# Patient Record
Sex: FEMALE | Race: AMERICAN INDIAN OR ALASKA NATIVE
[De-identification: names, ages, dates, MRNs, and addresses within clinical notes are randomized per-mention and may not be internally consistent; named-entity substitution may affect disease eponyms.]

---

## 2017-06-12 ENCOUNTER — HOSPITAL ENCOUNTER (OUTPATIENT)
Dept: HOSPITAL 5 - FLUORO | Age: 47
Discharge: HOME | End: 2017-06-12
Attending: SPECIALIST
Payer: MEDICAID

## 2017-06-12 DIAGNOSIS — I10: ICD-10-CM

## 2017-06-12 DIAGNOSIS — K30: Primary | ICD-10-CM

## 2017-06-12 PROCEDURE — 74247: CPT

## 2017-06-12 NOTE — FLUOROSCOPY REPORT
UPPER GI



INDICATION: Dyspepsia. Lap Band in 2015.



COMPARISON: 9/26/2015 upper GI report; images not retrievable at this 

time.



FINDINGS: Upper GI performed. Patient swallowed thick and thin barium 

without any difficulty and also tolerated effervescent granules well.



 view demonstrates normal appearance of the lap band and its 

connector tubing leading to the port overlying the L3 vertebral body in 

the midline. Nonobstructive bowel gas pattern.  Colonic stool/possible 

constipation.  Lower thoracic and lower lumbar degenerative changes.  

Bilateral SI joint sclerosis, more so along the iliac aspects.  Small 

left pelvic phlebolith.  Left iliac vein stent.



Esophagus is normal in course and caliber without focal mucosal 

abnormality or abnormal peristalsis. No demonstratable hiatal hernia or 

gastroesophageal reflux. Prompt passage of contrast through the lap 

band into the opacified stomach without evidence of peptic ulcer 

disease. Normal duodenal bulb and C-loop.



CONCLUSION: Normal upper GI exam, as described. Lap band correlation 

may also be obtained clinically.



Thank you for the opportunity to participate in this patient's care.

## 2017-08-03 ENCOUNTER — HOSPITAL ENCOUNTER (OUTPATIENT)
Dept: HOSPITAL 5 - GIO | Age: 47
Discharge: HOME | End: 2017-08-03
Attending: SURGERY
Payer: MEDICAID

## 2017-08-03 VITALS — DIASTOLIC BLOOD PRESSURE: 118 MMHG | SYSTOLIC BLOOD PRESSURE: 179 MMHG

## 2017-08-03 DIAGNOSIS — K21.9: Primary | ICD-10-CM

## 2017-08-03 DIAGNOSIS — Z98.84: ICD-10-CM

## 2017-08-03 DIAGNOSIS — Z79.82: ICD-10-CM

## 2017-08-03 DIAGNOSIS — Z98.890: ICD-10-CM

## 2017-08-03 DIAGNOSIS — Z88.8: ICD-10-CM

## 2017-08-03 DIAGNOSIS — Z53.8: ICD-10-CM

## 2017-08-03 DIAGNOSIS — Z79.899: ICD-10-CM

## 2017-08-03 DIAGNOSIS — I10: ICD-10-CM

## 2017-08-03 DIAGNOSIS — M06.9: ICD-10-CM

## 2017-08-03 NOTE — ANESTHESIA CONSULTATION
Anesthesia Consult and Med Hx


Date of service: 08/03/17





- Airway


Anesthetic Teeth Evaluation: Good


ROM Head & Neck: Adequate


Mental/Hyoid Distance: Adequate


Mallampati Class: Class I


Intubation Access Assessment: Good





- Pulmonary Exam


CTA: Yes





- Cardiac Exam


Cardiac Exam: RRR





- Pre-Operative Health Status


ASA Pre-Surgery Classification: ASA3


Proposed Anesthetic Plan: MAC





- Pulmonary


Hx Smoking: No


Hx Sleep Apnea: Yes (doing sleep study on 8/7/17)





- Cardiovascular System


Hx Hypertension: Yes (metoprolol, losartan/HCTZ)


Hx Coronary Artery Disease: Yes


Hx Heart Attack/AMI: Yes (March 2017, last saw cardiologist a month ago for 

follow up)





- Other Systems


Hx Obesity: Yes

## 2017-08-21 ENCOUNTER — HOSPITAL ENCOUNTER (OUTPATIENT)
Dept: HOSPITAL 5 - GIO | Age: 47
Discharge: HOME | End: 2017-08-21
Attending: SURGERY
Payer: MEDICAID

## 2017-08-21 VITALS — DIASTOLIC BLOOD PRESSURE: 108 MMHG | SYSTOLIC BLOOD PRESSURE: 156 MMHG

## 2017-08-21 DIAGNOSIS — I25.10: ICD-10-CM

## 2017-08-21 DIAGNOSIS — Z79.82: ICD-10-CM

## 2017-08-21 DIAGNOSIS — E66.9: ICD-10-CM

## 2017-08-21 DIAGNOSIS — K44.9: Primary | ICD-10-CM

## 2017-08-21 DIAGNOSIS — G47.33: ICD-10-CM

## 2017-08-21 DIAGNOSIS — I25.2: ICD-10-CM

## 2017-08-21 DIAGNOSIS — I10: ICD-10-CM

## 2017-08-21 DIAGNOSIS — Z98.84: ICD-10-CM

## 2017-08-21 DIAGNOSIS — Z88.5: ICD-10-CM

## 2017-08-21 DIAGNOSIS — M06.9: ICD-10-CM

## 2017-08-21 DIAGNOSIS — Z99.81: ICD-10-CM

## 2017-08-21 DIAGNOSIS — Z88.8: ICD-10-CM

## 2017-08-21 DIAGNOSIS — Z79.899: ICD-10-CM

## 2017-08-21 PROCEDURE — 43239 EGD BIOPSY SINGLE/MULTIPLE: CPT

## 2017-08-21 PROCEDURE — 96374 THER/PROPH/DIAG INJ IV PUSH: CPT

## 2017-08-21 PROCEDURE — 88342 IMHCHEM/IMCYTCHM 1ST ANTB: CPT

## 2017-08-21 PROCEDURE — 88305 TISSUE EXAM BY PATHOLOGIST: CPT

## 2017-08-21 NOTE — ANESTHESIA CONSULTATION
Anesthesia Consult and Med Hx


Date of service: 08/21/17





- Airway


Anesthetic Teeth Evaluation: Poor (missing rt upper canine)


ROM Head & Neck: Adequate


Mental/Hyoid Distance: Adequate


Mallampati Class: Class II


Intubation Access Assessment: Probably Good





- Pulmonary Exam


CTA: Yes





- Cardiac Exam


Cardiac Exam: RRR





- Pre-Operative Health Status


ASA Pre-Surgery Classification: ASA3


Proposed Anesthetic Plan: MAC





- Pulmonary


Hx Smoking: No


Hx Sleep Apnea: Yes (on O2 2L at night, CPAP pending)





- Cardiovascular System


Hx Hypertension: Yes


Hx Coronary Artery Disease: Yes


Hx Heart Attack/AMI: Yes (3/17)


Hx Percutaneous Transluminal Coronary Angioplasty (PTCA): No





- Central Nervous System


Hx Neuromuscular Disorder: Yes (RA)


Hx Seizures: No


CVA: No





- Endocrine


Hx Renal Disease: No


Hx Liver Disease: No


Hx Non-Insulin Dependent Diabetes: No





- Other Systems


Hx Obesity: Yes





- Additional Comments


Anesthesia Medical History Comments: NAC

## 2017-08-21 NOTE — DISCHARGE SUMMARY
Providers





- Providers


Date of discharge: 08/21/17


Attending physician: 


ALVINA REAL








Hospitalization


Condition: Good


Procedures: 





EGD, biopsy 


Hospital course: 





47 F had an EGD to evaluate her gastric band due to abdominal discomfort. 


Disposition: DC-01 TO HOME OR SELFCARE





Core Measure Documentation





- Palliative Care


Palliative Care/ Comfort Measures: Not Applicable





- Core Measures


Any of the following diagnoses?: none





Exam





- Physical Exam


Narrative exam: 





no change from preop





- Constitutional


Vitals: 


 











Temp Pulse Resp BP Pulse Ox


 


 97.6 F   68   14   189/121   100 


 


 08/21/17 08:06  08/21/17 08:06  08/21/17 08:06  08/21/17 08:06  08/21/17 08:06














Plan


Activity: no restrictions


Diet: other (high protien)

## 2017-12-14 ENCOUNTER — HOSPITAL ENCOUNTER (INPATIENT)
Dept: HOSPITAL 5 - ED | Age: 47
LOS: 1 days | Discharge: HOME | DRG: 206 | End: 2017-12-15
Attending: INTERNAL MEDICINE | Admitting: INTERNAL MEDICINE
Payer: MEDICAID

## 2017-12-14 DIAGNOSIS — E78.5: ICD-10-CM

## 2017-12-14 DIAGNOSIS — G62.9: ICD-10-CM

## 2017-12-14 DIAGNOSIS — G47.33: ICD-10-CM

## 2017-12-14 DIAGNOSIS — I25.2: ICD-10-CM

## 2017-12-14 DIAGNOSIS — K29.00: ICD-10-CM

## 2017-12-14 DIAGNOSIS — R00.2: ICD-10-CM

## 2017-12-14 DIAGNOSIS — Z79.899: ICD-10-CM

## 2017-12-14 DIAGNOSIS — Z72.89: ICD-10-CM

## 2017-12-14 DIAGNOSIS — M79.7: ICD-10-CM

## 2017-12-14 DIAGNOSIS — M06.9: ICD-10-CM

## 2017-12-14 DIAGNOSIS — Z98.51: ICD-10-CM

## 2017-12-14 DIAGNOSIS — Z88.5: ICD-10-CM

## 2017-12-14 DIAGNOSIS — Z79.82: ICD-10-CM

## 2017-12-14 DIAGNOSIS — I10: ICD-10-CM

## 2017-12-14 DIAGNOSIS — M94.0: Primary | ICD-10-CM

## 2017-12-14 DIAGNOSIS — Z82.49: ICD-10-CM

## 2017-12-14 DIAGNOSIS — Z88.8: ICD-10-CM

## 2017-12-14 LAB
ANION GAP SERPL CALC-SCNC: 19 MMOL/L
BASOPHILS NFR BLD AUTO: 1.3 % (ref 0–1.8)
BUN SERPL-MCNC: 15 MG/DL (ref 7–17)
BUN/CREAT SERPL: 14 %
CALCIUM SERPL-MCNC: 9.9 MG/DL (ref 8.4–10.2)
CHLORIDE SERPL-SCNC: 95.3 MMOL/L (ref 98–107)
CK SERPL-CCNC: 66 UNITS/L (ref 30–135)
CO2 SERPL-SCNC: 28 MMOL/L (ref 22–30)
EOSINOPHIL NFR BLD AUTO: 7.5 % (ref 0–4.3)
GLUCOSE SERPL-MCNC: 106 MG/DL (ref 65–100)
HCT VFR BLD CALC: 49.7 % (ref 30.3–42.9)
HGB BLD-MCNC: 15.8 GM/DL (ref 10.1–14.3)
MCH RBC QN AUTO: 23 PG (ref 28–32)
MCHC RBC AUTO-ENTMCNC: 32 % (ref 30–34)
MCV RBC AUTO: 71 FL (ref 79–97)
PLATELET # BLD: 670 K/MM3 (ref 140–440)
POTASSIUM SERPL-SCNC: 4.7 MMOL/L (ref 3.6–5)
RBC # BLD AUTO: 7.02 M/MM3 (ref 3.65–5.03)
SODIUM SERPL-SCNC: 138 MMOL/L (ref 137–145)
WBC # BLD AUTO: 7.9 K/MM3 (ref 4.5–11)

## 2017-12-14 PROCEDURE — 96374 THER/PROPH/DIAG INJ IV PUSH: CPT

## 2017-12-14 PROCEDURE — 71010: CPT

## 2017-12-14 PROCEDURE — 84484 ASSAY OF TROPONIN QUANT: CPT

## 2017-12-14 PROCEDURE — 36415 COLL VENOUS BLD VENIPUNCTURE: CPT

## 2017-12-14 PROCEDURE — 85025 COMPLETE CBC W/AUTO DIFF WBC: CPT

## 2017-12-14 PROCEDURE — 82553 CREATINE MB FRACTION: CPT

## 2017-12-14 PROCEDURE — 94760 N-INVAS EAR/PLS OXIMETRY 1: CPT

## 2017-12-14 PROCEDURE — 99285 EMERGENCY DEPT VISIT HI MDM: CPT

## 2017-12-14 PROCEDURE — 82550 ASSAY OF CK (CPK): CPT

## 2017-12-14 PROCEDURE — 85007 BL SMEAR W/DIFF WBC COUNT: CPT

## 2017-12-14 PROCEDURE — 93005 ELECTROCARDIOGRAM TRACING: CPT

## 2017-12-14 PROCEDURE — 93010 ELECTROCARDIOGRAM REPORT: CPT

## 2017-12-14 PROCEDURE — 96375 TX/PRO/DX INJ NEW DRUG ADDON: CPT

## 2017-12-14 PROCEDURE — 80048 BASIC METABOLIC PNL TOTAL CA: CPT

## 2017-12-14 NOTE — XRAY REPORT
FINAL REPORT



EXAM:  XR CHEST 1V AP



HISTORY:  chest pain 



TECHNIQUE:  Frontal portable chest x-ray



Comparison: None



FINDINGS:  

Heart size is upper limits normal.



There are hazy bilateral ill-defined apparent infiltrates part of

which may be related to the patient's body habitus.



Costophrenic angles are sharp laterally. 



IMPRESSION:  

Possible ill-defined infiltrates versus incomplete penetration

due to patient's body habitus. Recommend two view chest or at

least a PA chest when able.

## 2017-12-14 NOTE — EMERGENCY DEPARTMENT REPORT
HPI





- General


Chief Complaint: Chest Pain


Time Seen by Provider: 12/14/17 21:30





- HPI


HPI: 





Room 7





The patient is a 47-year-old female presenting with a chief complaint chest 

pain.  Patient states at approximately 17:00 she developed throbbing substernal 

chest pain associated with shortness of breath nausea vomiting.  Patient denies 

diaphoresis.  The patient states she has a history of fibromyalgia and took a 

new medication, Cymbalta, at 13:00.  The patient states she was asymptomatic 

until approximately 17:00.  The patient gives her pain a score of 9/10





Location: Chest, see above


Duration: Intermittent since 17:00


Quality: Throbbing


Severity: 9/10


Modifying factors: [see above]


Context: [see above]


Mode of transportation: Unknown





ED Past Medical Hx





- Past Medical History


Hx Hypertension: Yes


Hx Heart Attack/AMI: Yes (3/17)





- Surgical History


Additional Surgical History: lap band 2014, carpal tunnel surgery, bilateral 

tubal ligation, skin graft





- Family History


Family history: no significant





- Social History


Smoking Status: Never Smoker


Substance Use Type: None (denies illicit drug use), Alcohol (occasional)





- Medications


Home Medications: 


 Home Medications











 Medication  Instructions  Recorded  Confirmed  Last Taken  Type


 


Aspirin [Aspirin TAB] 325 mg PO QDAY 09/26/15 08/21/17 08/18/17 History


 


Carvedilol [Coreg] 25 mg PO BID 09/26/15 08/21/17 1 Day Ago History





    ~09/25/15 


 


Furosemide [Lasix TAB] 40 mg PO QDAY 09/26/15 08/21/17 1 Day Ago History





    ~09/25/15 


 


Omeprazole [PriLOSEC] 20 mg PO QDAY 09/26/15 08/21/17 1 Day Ago History





    ~09/25/15 


 


Potassium Chloride [K-Dur] 20 meq PO QDAY 09/26/15 08/21/17 1 Day Ago History





    ~09/25/15 


 


Valsartan [Diovan] 40 mg PO DAILY 09/26/15 08/21/17 1 Day Ago History





    ~09/25/15 


 


Hydrochlorothiazide 25 mg PO DAILY 08/03/17 08/21/17 08/19/17 History


 


Losartan 100 mg PO DAILY 08/03/17 08/21/17 08/21/17 History


 


Metoprolol 100 mg PO DAILY 08/03/17 08/21/17 08/21/17 History














ED Review of Systems


ROS: 


Stated complaint: CP


Other details as noted in HPI





Constitutional: denies: diaphoresis


Respiratory: shortness of breath


Cardiovascular: chest pain


Gastrointestinal: nausea, vomiting


Neurological: headache





Physical Exam





- Physical Exam


Vital Signs: 


 Vital Signs











  12/14/17





  21:08


 


Temperature 98.1 F


 


Pulse Rate 89


 


Respiratory 18





Rate 


 


Blood Pressure 203/120


 


O2 Sat by Pulse 100





Oximetry 











Physical Exam: 





GENERAL: The patient is well-developed well-nourished female lying on stretcher 

not appearing to be in acute distress. []


HEENT: Normocephalic.  Atraumatic.  Extraocular motions are intact.  Patient 

has moist mucous membranes.


NECK: Supple.  Trachea midline


CHEST/LUNGS: Clear to auscultation.  There is no respiratory distress noted.


HEART/CARDIOVASCULAR: Regular.  There is no tachycardia.  There is no gallop 

rub or murmur.


ABDOMEN: Abdomen is soft, nontender.  Patient has normal bowel sounds.  There 

is no abdominal distention.


SKIN: There is no rash.  There is no edema.  There is no diaphoresis.


NEURO: The patient is awake, alert, and oriented.  The patient is cooperative. 

The patient has normal speech


MUSCULOSKELETAL:  There is no evidence of acute injury.





ED Course


 Vital Signs











  12/14/17





  21:08


 


Temperature 98.1 F


 


Pulse Rate 89


 


Respiratory 18





Rate 


 


Blood Pressure 203/120


 


O2 Sat by Pulse 100





Oximetry 














ED Medical Decision Making





- Lab Data


Result diagrams: 


 12/14/17 Unknown





 12/14/17 Unknown





 Laboratory Tests











  12/14/17 12/14/17





  Unknown Unknown


 


WBC  7.9 


 


RBC  7.02 H 


 


Hgb  15.8 H 


 


Hct  49.7 H 


 


MCV  71 L 


 


MCH  23 L 


 


MCHC  32 


 


RDW  18.9 H 


 


Plt Count  670 H 


 


Lymph % (Auto)  22.8 


 


Mono % (Auto)  8.7 H 


 


Eos % (Auto)  7.5 H 


 


Baso % (Auto)  1.3 


 


Lymph #  1.8 


 


Mono #  0.7 


 


Eos #  0.6 H 


 


Baso #  0.1 


 


Seg Neutrophils %  59.7 


 


Seg Neutrophils #  4.7 


 


Sodium   138


 


Potassium   4.7


 


Chloride   95.3 L


 


Carbon Dioxide   28


 


Anion Gap   19


 


BUN   15


 


Creatinine   1.1


 


Estimated GFR   > 60


 


BUN/Creatinine Ratio   14


 


Glucose   106 H


 


Calcium   9.9


 


Total Creatine Kinase   66


 


CK-MB (CK-2)   1.3


 


CK-MB (CK-2) Rel Index   1.9


 


Troponin T   < 0.010














- EKG Data


-: EKG Interpreted by Me


EKG shows normal: sinus rhythm


Rate: normal





- EKG Data


When compared to previous EKG there are: no significant change


Interpretation: nonspecific ST-T wave sonia (T-wave inversion in lead V2)





- Radiology Data


Radiology results: image reviewed (chest x-ray)


interpreted by me: 





Chest x-ray-no focal infiltrates, no pneumothorax





- Differential Diagnosis


ACS, GERD, pericarditis


Critical care attestation.: 


If time is entered above; I have spent that time in minutes in the direct care 

of this critically ill patient, excluding procedure time.








ED Disposition


Clinical Impression: 


 Chest pain





Disposition: DC-09 OP ADMIT IP TO THIS HOSP


Is pt being admited?: Yes


Does the pt Need Aspirin: Yes


Condition: Fair


Instructions:  Chest Pain (ED)


Referrals: 


PRIMARY CARE,MD [Primary Care Provider] - 3-5 Days


Time of Disposition: 23:04 (hospitalist paged (Dr. Chantelle Powers))

## 2017-12-14 NOTE — HISTORY AND PHYSICAL REPORT
History of Present Illness


Date of examination: 12/14/17


History of present illness: 


47-year-old woman with a history of hypertension, hyperlipidemia, was brought 

to the emergency room  for chest pain that started today.  Pain is in the 

epigastric  area, radiating to the left sneck,  which he describes as a sharp 

pain, intermittent in nature lasting for 7 minutes, intensity 5/10, she cannot 

identify exacerbating or relieving factors.  She denies nausea vomiting, 

diaphoresis, palpitation, shortness of breath


Review Of  Systems:


Constitutional: no weight loss


Ears, eyes, nose, mouth and throat: no nasal congestion, no nasal discharge, no 

sinus pressure, blurry vision, diplopia


Neck: No neck pain or rigidity.


Cardiovascular: no  orthopnea, palpitations


Respiratory: No cough


Gastrointestinal:no  abdominal pain, hematochezia


Genitourinary : no dysuria, frequency , hematuria


Musculoskeletal: no muscle ache 


Integumentary: no rash, no pruritis


Neurological: no parathesias, focal weakness


Endocrine: no cold or heat intolerance, no polyuria or polydipsia


Hematologic/Lymphatic: no easy bruising, no easy bleeding, no gland swelling


Allergic/Immunologic: no urticaria, no angioedema.





PAST MEDICAL HISTORY:hypertension, hyperlipidemia





PAST SURGICAL HISTORY: neck surgery





FAMILY HISTORY: Hypertension





SOCIAL HISTORY: Denies alcohol, tobacco, drugs











Medications and Allergies


 Allergies











Allergy/AdvReac Type Severity Reaction Status Date / Time


 


hydrocodone Allergy  Itching Verified 09/26/15 07:14


 


olmesartan medoxomil Allergy  Swelling Verified 08/03/17 07:48





[From Benicar]     


 


lisinopril AdvReac  Cough Verified 09/26/15 07:14


 


plant stanol jonnie AdvReac  STIFF LIMBS Verified 08/01/17 11:40





[From Benecol]     











 Home Medications











 Medication  Instructions  Recorded  Confirmed  Last Taken  Type


 


Aspirin [Aspirin TAB] 325 mg PO QDAY 09/26/15 12/15/17 1 Day Ago History





    ~12/14/17 


 


Carvedilol [Coreg] 25 mg PO BID 09/26/15 12/15/17 1 Day Ago History





    ~12/14/17 


 


Furosemide [Lasix TAB] 40 mg PO QDAY 09/26/15 12/15/17 1 Day Ago History





    ~12/14/17 


 


Omeprazole [PriLOSEC] 20 mg PO QDAY 09/26/15 12/15/17 1 Day Ago History





    ~12/14/17 


 


Potassium Chloride [K-Dur] 20 meq PO QDAY 09/26/15 12/15/17 1 Day Ago History





    ~12/14/17 


 


Losartan [Cozaar] 100 mg PO QDAY 08/03/17 12/15/17 1 Day Ago History





    ~12/14/17 














Exam





- Constitutional


Vitals: 


 











Temp Pulse Resp BP Pulse Ox


 


 98.1 F   92 H  20   188/121   96 


 


 12/14/17 21:08  12/14/17 23:00  12/14/17 23:06  12/14/17 23:00  12/14/17 23:06














Results





- Labs


CBC & Chem 7: 


 12/15/17 04:46





 12/15/17 04:46


Labs: 


 Abnormal lab results











  12/14/17 12/14/17 Range/Units





  Unknown Unknown 


 


RBC  7.02 H   (3.65-5.03)  M/mm3


 


Hgb  15.8 H   (10.1-14.3)  gm/dl


 


Hct  49.7 H   (30.3-42.9)  %


 


MCV  71 L   (79-97)  fl


 


MCH  23 L   (28-32)  pg


 


RDW  18.9 H   (13.2-15.2)  %


 


Plt Count  670 H   (140-440)  K/mm3


 


Mono % (Auto)  8.7 H   (0.0-7.3)  %


 


Eos % (Auto)  7.5 H   (0.0-4.3)  %


 


Eos #  0.6 H   (0.0-0.4)  K/mm3


 


Chloride   95.3 L  ()  mmol/L


 


Glucose   106 H  ()  mg/dL














Assessment and Plan


Assessment


Atypical Chest pain


Hypertension


Hyperlipidemia





Plan


Admit to medicine


Check cardiac enzymes, consult cardiology


Start aspirin, morphine, dvt prophalaxis


Continue appropiate outpatient medications

## 2017-12-15 VITALS — DIASTOLIC BLOOD PRESSURE: 52 MMHG | SYSTOLIC BLOOD PRESSURE: 92 MMHG

## 2017-12-15 LAB
%HYPO/RBC NFR BLD AUTO: (no result) %
ANION GAP SERPL CALC-SCNC: 19 MMOL/L
ANISOCYTOSIS BLD QL SMEAR: (no result)
BLASTOCYTES % (MANUAL): 0 %
BUN SERPL-MCNC: 17 MG/DL (ref 7–17)
BUN/CREAT SERPL: 12 %
CALCIUM SERPL-MCNC: 9.6 MG/DL (ref 8.4–10.2)
CHLORIDE SERPL-SCNC: 97.2 MMOL/L (ref 98–107)
CK SERPL-CCNC: 46 UNITS/L (ref 30–135)
CK SERPL-CCNC: 59 UNITS/L (ref 30–135)
CO2 SERPL-SCNC: 29 MMOL/L (ref 22–30)
GLUCOSE SERPL-MCNC: 98 MG/DL (ref 65–100)
HCT VFR BLD CALC: 47.2 % (ref 30.3–42.9)
HGB BLD-MCNC: 14.3 GM/DL (ref 10.1–14.3)
MCH RBC QN AUTO: 22 PG (ref 28–32)
MCHC RBC AUTO-ENTMCNC: 30 % (ref 30–34)
MCV RBC AUTO: 71 FL (ref 79–97)
PLATELET # BLD: 668 K/MM3 (ref 140–440)
POTASSIUM SERPL-SCNC: 4.2 MMOL/L (ref 3.6–5)
RBC # BLD AUTO: 6.66 M/MM3 (ref 3.65–5.03)
SODIUM SERPL-SCNC: 141 MMOL/L (ref 137–145)
TOTAL CELLS COUNTED PERCENT: 20
WBC # BLD AUTO: 6.1 K/MM3 (ref 4.5–11)

## 2017-12-15 RX ADMIN — MORPHINE SULFATE PRN MG: 2 INJECTION, SOLUTION INTRAMUSCULAR; INTRAVENOUS at 00:58

## 2017-12-15 RX ADMIN — MORPHINE SULFATE PRN MG: 2 INJECTION, SOLUTION INTRAMUSCULAR; INTRAVENOUS at 05:11

## 2017-12-15 RX ADMIN — METOPROLOL TARTRATE SCH: 100 TABLET, FILM COATED ORAL at 12:44

## 2017-12-15 RX ADMIN — LOSARTAN POTASSIUM SCH MG: 50 TABLET, FILM COATED ORAL at 12:40

## 2017-12-15 RX ADMIN — LOSARTAN POTASSIUM SCH: 50 TABLET, FILM COATED ORAL at 12:44

## 2017-12-15 RX ADMIN — METOPROLOL TARTRATE SCH MG: 100 TABLET, FILM COATED ORAL at 12:41

## 2017-12-15 NOTE — CONSULTATION
History of Present Illness


Consult date: 12/15/17


Requesting physician: MARTIN WATKINS


Consult reason: chest pain


History of present illness: 


The pt is a 46 YO female with a past medical history significant for HTN, ANA, 

RA, peripheral neuropathy, fibromyalgia. She is followed in our office by Dr. Calvert. She presented with c/o palpitations, nausea and vomiting after taking 

Cymbalta. She was feeling quite well yesterday and was out with her  

when she took the Cymbalta around 1:00PM. Around 5PM, she noted the onset of 

her symptoms and decided to seek medical attention. She is concerned her 

symptoms were side effects of the Cymbalta. She denies any chest pain, 

diaphoresis, dizziness or syncope. On evaluation, she states that all of her 

symptoms have resolved and she is back to her usual state of health. 





LHC done 11/2013 showed no significant coronary artery disease, normal EF of 55%

. 


Lexiscan MPI stress test done 3/2017 was negative for ischemia, EF 61%. 


Echo done 2/2016 showed EF 55-60%, impaired relaxation. 








Past History


Past Medical History: hypertension, other (RA; fibromyalgia; ANA)


Social history: , lives with family.  denies: smoking, alcohol abuse, 

prescription drug abuse





Medications and Allergies


 Allergies











Allergy/AdvReac Type Severity Reaction Status Date / Time


 


hydrocodone Allergy  Itching Verified 09/26/15 07:14


 


olmesartan medoxomil Allergy  Swelling Verified 08/03/17 07:48





[From Benicar]     


 


lisinopril AdvReac  Cough Verified 09/26/15 07:14


 


plant stanol jonnie AdvReac  STIFF LIMBS Verified 08/01/17 11:40





[From Benecol]     











 Home Medications











 Medication  Instructions  Recorded  Confirmed  Last Taken  Type


 


Aspirin [Aspirin TAB] 325 mg PO QDAY 09/26/15 12/15/17 1 Day Ago History





    ~12/14/17 


 


Carvedilol [Coreg] 25 mg PO BID 09/26/15 12/15/17 1 Day Ago History





    ~12/14/17 


 


Furosemide [Lasix TAB] 40 mg PO QDAY 09/26/15 12/15/17 1 Day Ago History





    ~12/14/17 


 


Omeprazole [PriLOSEC] 20 mg PO QDAY 09/26/15 12/15/17 1 Day Ago History





    ~12/14/17 


 


Potassium Chloride [K-Dur] 20 meq PO QDAY 09/26/15 12/15/17 1 Day Ago History





    ~12/14/17 


 


Valsartan [Diovan] 40 mg PO DAILY 09/26/15 12/15/17 1 Day Ago History





    ~12/14/17 


 


Hydrochlorothiazide [HCTZ] 25 mg PO DAILY 08/03/17 12/15/17 1 Day Ago History





    ~12/14/17 


 


Losartan [Cozaar] 100 mg PO QDAY 08/03/17 12/15/17 1 Day Ago History





    ~12/14/17 


 


Metoprolol [Lopressor] 100 mg PO DAILY 08/03/17 12/15/17 1 Day Ago History





    ~12/14/17 











Active Meds: 


Active Medications





Acetaminophen (Tylenol)  650 mg PO Q4H PRN


   PRN Reason: Pain MILD(1-3)/Fever >100.5/HA


Aspirin (Aspirin)  325 mg PO QDAY [facility]


Bisacodyl (Dulcolax)  10 mg [facility] QDAY PRN


   PRN [place]: [place] [facility] [place]


Carvedilol (Coreg)  25 mg PO BID [facility]


Enoxaparin Sodium (Lovenox)  40 mg SUB-Q QDAY [facility]


Losartan Potassium (Cozaar)  100 mg PO DAILY [facility]


Magnesium Hydroxide (Milk Of Magnesia)  30 ml PO Q4H PRN


   PRN Reason: Constipation


Metoprolol Tartrate (Lopressor)  100 mg PO DAILY [facility]


Morphine Sulfate (Morphine)  2 mg IV Q4H PRN


   PRN Reason: Pain, Moderate (4-6)


   Last Admin: 12/15/17 05:11 Dose:  2 mg


Ondansetron HCl (Zofran)  4 mg IV Q8H PRN


   PRN Reason: N/V unrelieved by [facility]


Pantoprazole Sodium (Protonix)  20 mg PO QDAY [facility]











Review of Systems


Constitutional: no weight loss, no weight gain, no fever, no chills, no sweats


Ears, nose, mouth and throat: no ear pain, no nose pain, no sinus pressure, no 

sinus pain


Cardiovascular: palpitations, no chest pain, no orthopnea, no rapid/irregular 

heart beat, no edema, no syncope, no lightheadedness, no shortness of breath, 

no dyspnea on exertion, no paroxysmal nocturnal dyspnea, no leg edema, no 

decreased exercise tolerance


Respiratory: no cough, no shortness of breath, no dyspnea on exertion, no 

congestion, no wheezing, no pain on inspiration


Gastrointestinal: nausea, vomiting, no abdominal pain, no diarrhea, no 

constipation, no change in bowel habits


Genitourinary Female: no pelvic pain, no flank pain, no dysuria, no urinary 

frequency, no urgency


Musculoskeletal: no neck stiffness, no neck pain, no shooting arm pain, no arm 

numbness/tingling, no low back pain, no shooting leg pain, no leg numbness/

tingling, no redness of joints


Integumentary: no rash, no pruritis, no redness, no sores, no wounds


Neurological: no head injury, no paralysis, no weakness, no parathesias, no 

numbness, no tingling, no seizures, no syncope


Psychiatric: anxiety


Endocrine: no cold intolerance, no heat intolerance


Hematologic/Lymphatic: no easy bruising, no easy bleeding, no lymphadenopathy


Allergic/Immunologic: no urticaria, no wheezing, no persistent infections





Physical Examination


 Vital Signs











Temp Pulse Resp BP Pulse Ox


 


 98.1 F   89   18   203/120   100 


 


 12/14/17 21:08  12/14/17 21:08  12/14/17 21:08  12/14/17 21:08  12/14/17 21:08











General appearance: no acute distress


HEENT: Positive: PERRL, Normocephaly, Mucus Membranes Moist


Neck: Positive: neck supple, trachea midline


Cardiac: Positive: Reg Rate and Rhythm, S1/S2


Lungs: Positive: clear to auscultation


Neuro: Positive: Grossly Intact, Cranial Nerve 2-12 Intact


Abdomen: Positive: Unremarkable, Soft, Active Bowel Sounds.  Negative: Tender


Skin: Positive: Clear.  Negative: Rash, Wound


Musculoskeletal: No Fluid Collection, No Pain, Normal Range of Motion


Extremities: Absent: edema





Results





 12/15/17 04:46





 12/15/17 04:46


 Cardiac Enzymes











  12/14/17 12/14/17 12/15/17 Range/Units





  23:43 Unknown 04:46 


 


CK-MB (CK-2)  1.3  1.3  1.1  (0.0-4.0)  ng/mL








 CBC











  12/14/17 12/15/17 Range/Units





  Unknown 04:46 


 


WBC  7.9  6.1  (4.5-11.0)  K/mm3


 


RBC  7.02 H  6.66 H  (3.65-5.03)  M/mm3


 


Hgb  15.8 H  14.3  (10.1-14.3)  gm/dl


 


Hct  49.7 H  47.2 H  (30.3-42.9)  %


 


Plt Count  670 H  668 H  (140-440)  K/mm3


 


Lymph #  1.8   (1.2-5.4)  K/mm3


 


Mono #  0.7   (0.0-0.8)  K/mm3


 


Eos #  0.6 H   (0.0-0.4)  K/mm3


 


Baso #  0.1   (0.0-0.1)  K/mm3








 Comprehensive Metabolic Panel











  12/14/17 12/15/17 Range/Units





  Unknown 04:46 


 


Sodium  138  141  (137-145)  mmol/L


 


Potassium  4.7  4.2  (3.6-5.0)  mmol/L


 


Chloride  95.3 L  97.2 L  ()  mmol/L


 


Carbon Dioxide  28  29  (22-30)  mmol/L


 


BUN  15  17  (7-17)  mg/dL


 


Creatinine  1.1  1.4 H  (0.7-1.2)  mg/dL


 


Glucose  106 H  98  ()  mg/dL


 


Calcium  9.9  9.6  (8.4-10.2)  mg/dL














- Imaging and Cardiology


Echo: report reviewed (3/2017 was negative for ischemia, EF 61%. )


Cardiac cath: report reviewed (11/2013 showed no significant coronary artery 

disease, normal EF of 55%)


EKG: report reviewed, image reviewed





EKG interpretations





- Telemetry


EKG Rhythm: Sinus Rhythm





- EKG


Sinus rhythms and dysrhythmias: sinus rhythm





Assessment and Plan


Assessment:


Palpitations / nausea and vomiting - resolved; suspect secondary to Cymbalta


HTN


ANA


RA


Peripheral neuropathy


Fibromyalgia 





Plan:


No current indication for any further cardiac testing at this time given recent 

stress test and echo. Pt denies chest pain. AMI ruled out. 


Currently stable cardiac status. Pt may discharge home from cardiology 

standpoint. 


Follow up in our [place] office with Dr. Calvert with in 1-2 weeks of 

hospital discharge ([phone]). 


Assessment and plan reviewed with pt at bedside. 





The patient has been seen in conjunction with Dr. Pina who agrees with the 

assessment and plan of care.

## 2017-12-15 NOTE — DISCHARGE SUMMARY
Providers





- Providers


Date of Admission: 


12/14/17 23:32





Date of discharge: 12/15/17


Attending physician: 


AMY J KOCHERLA





 





12/14/17 23:32


Consult to Physician [CONS] Routine 


   Consulting Provider: CORRIE LUGO


   Reason For Exam: cp


   Place consult to:: [facility]


   Notified:: y


   Comment:: added to list











Primary care physician: 


PRIMARY CARE MD








Hospitalization


Reason for admission: left-sided chest pain


Condition: Poor


Pertinent studies: 


LHC done 11/2013 showed no significant coronary artery disease, normal EF of 55%

. 


Lexiscan MPI stress test done 3/2017 was negative for ischemia, EF 61%. 


Echo done 2/2016 showed EF 55-60%, impaired relaxation. 





Hospital course: 


47-year-old female patient with significant history of hypertension and 

obstructive sleep apnea.  Rheumatoid arthritis.  For neuropathy and fibromyalgia


Admitted through emergency room with left-sided chest pain


Patient admitted to hospital symptomatically managed, evaluated by cardiology


However patient had negative heart In 11 2013 with ejection fraction of 55%, 

Lexiscan was negative with normal ejection fraction 3/2017


Cardiology optimizing medications, and did not plan any further workup


Date of discharge patient was comfortably no new complaints vital signs stable\


Physical examination is unremarkable


Patient is hemodynamically and clinically stable at time of discharge








Discharge diagnoses;


-Noncardiac chest pain; probably secondary to costochondritis


-Hypertension


-Obstructive sleep apnea


-Rheumatoid arthritis


-Patient neuropathy


-Fibromyalgia


-Palpitations


-Acute gastritis














Disposition: DC-01 TO HOME OR SELFCARE


Time spent for discharge: 33 min





Core Measure Documentation





- Palliative Care


Palliative Care/ Comfort Measures: Not Applicable





- Core Measures


Any of the following diagnoses?: none





Exam





- Constitutional


Vitals: 


 











Temp Pulse Resp BP Pulse Ox


 


 97.8 F   70   99 H  94/56   99 


 


 12/15/17 10:49  12/15/17 10:49  12/15/17 10:49  12/15/17 10:49  12/15/17 10:49











General appearance: Present: no acute distress, well-nourished





- EENT


Eyes: Present: PERRL, EOM intact





- Neck


Neck: Present: supple, normal ROM





- Respiratory


Respiratory effort: normal


Respiratory: negative: rales, rhonchi, wheezing





- Cardiovascular


Rhythm: regular


Heart Sounds: Present: S1 & S2





- Extremities


Extremities: no ischemia, No edema





- Abdominal


General gastrointestinal: Present: soft, non-tender, non-distended, normal 

bowel sounds





- Integumentary


Integumentary: Present: clear, warm





- Musculoskeletal


Musculoskeletal: strength equal bilaterally





- Psychiatric


Psychiatric: appropriate mood/affect, cooperative





- Neurologic


Neurologic: CNII-XII intact, moves all extremities





Plan


Activity: no restrictions


Diet: other (cardiac diet)


Additional Instructions: f/u private cardiologist if you have recurrent chest 

pain or shortness of breath or go to ER.  Advised to comply with medications 

and diet.  Advised diet modification , exercise as tolerated and weight 

reduction and medically stable


Follow up with: 


PRIMARY CARE,MD [Primary Care Provider] - 3-5 Days

## 2020-03-11 ENCOUNTER — HOSPITAL ENCOUNTER (OUTPATIENT)
Dept: HOSPITAL 5 - FLUORO | Age: 50
Discharge: HOME | End: 2020-03-11
Attending: SURGERY
Payer: MEDICAID

## 2020-03-11 DIAGNOSIS — K30: Primary | ICD-10-CM

## 2020-03-11 PROCEDURE — 74246 X-RAY XM UPR GI TRC 2CNTRST: CPT

## 2020-03-11 NOTE — FLUOROSCOPY REPORT
UPPER GI



HISTORY: K30 FUNCTIONAL DYSPEPSIA.



TECHNIQUE:  Single and double contrast barium technique utilized to evaluate the esophagus, stomach, 
and duodenal C-loop.  



FINDINGS:  To begin the exam, swallowing was evaluated in the lateral position under direct fluorosco
py.  Swallowing was normal.



A lap band device is in good position in the left upper quadrant. There is easy passage of the contra
st agent through the lap band device. No evidence for slippage or erosion. No obstruction.



No mucosal irregularity, mass, mass effect, or critical stenosis.   There were no abnormal tertiary c
ontractions as seen with dysmotility. No gastroesophageal reflux.  



IMPRESSION:  Unremarkable exam. No abnormality with the lap band device is detected.



Fluoroscopic time: 1.1 minutes



Number of fluoroscopic images:  30



Signer Name: Abimael Bai Jr, MD 

Signed: 3/11/2020 11:52 AM

Workstation Name: [id number]

## 2021-01-26 ENCOUNTER — HOSPITAL ENCOUNTER (OUTPATIENT)
Dept: HOSPITAL 5 - LAB | Age: 51
Discharge: HOME | End: 2021-01-26
Attending: SURGERY
Payer: MEDICAID

## 2021-01-26 DIAGNOSIS — K30: ICD-10-CM

## 2021-01-26 DIAGNOSIS — E11.9: ICD-10-CM

## 2021-01-26 DIAGNOSIS — E66.01: Primary | ICD-10-CM

## 2021-01-26 LAB
%HYPO/RBC NFR BLD AUTO: (no result) %
ALBUMIN SERPL-MCNC: 4.2 G/DL (ref 3.9–5)
ALT SERPL-CCNC: 13 UNITS/L (ref 7–56)
ANISOCYTOSIS BLD QL SMEAR: (no result)
BAND NEUTROPHILS # (MANUAL): 0 K/MM3
BUN SERPL-MCNC: 21 MG/DL (ref 7–17)
BUN/CREAT SERPL: 12 %
CALCIUM SERPL-MCNC: 9.4 MG/DL (ref 8.4–10.2)
HCT VFR BLD CALC: 44.5 % (ref 30.3–42.9)
HDLC SERPL-MCNC: 119 MG/DL (ref 40–59)
HEMOLYSIS INDEX: 15
HGB BLD-MCNC: 14.4 GM/DL (ref 10.1–14.3)
IRON SERPL-MCNC: 40 UG/DL (ref 37–170)
MCHC RBC AUTO-ENTMCNC: 32 % (ref 30–34)
MCV RBC AUTO: 79 FL (ref 79–97)
MYELOCYTES # (MANUAL): 0 K/MM3
OVALOCYTES BLD QL SMEAR: (no result)
PLATELET # BLD: 255 K/MM3 (ref 140–440)
POIKILOCYTOSIS BLD QL SMEAR: (no result)
PROMYELOCYTES # (MANUAL): 0 K/MM3
RBC # BLD AUTO: 5.61 M/MM3 (ref 3.65–5.03)
TIBC SERPL-MCNC: 353 MCG/DL (ref 250–450)
TOTAL CELLS COUNTED BLD: 100

## 2021-01-26 PROCEDURE — 83036 HEMOGLOBIN GLYCOSYLATED A1C: CPT

## 2021-01-26 PROCEDURE — 84425 ASSAY OF VITAMIN B-1: CPT

## 2021-01-26 PROCEDURE — 36415 COLL VENOUS BLD VENIPUNCTURE: CPT

## 2021-01-26 PROCEDURE — 82607 VITAMIN B-12: CPT

## 2021-01-26 PROCEDURE — 82306 VITAMIN D 25 HYDROXY: CPT

## 2021-01-26 PROCEDURE — 84443 ASSAY THYROID STIM HORMONE: CPT

## 2021-01-26 PROCEDURE — 83550 IRON BINDING TEST: CPT

## 2021-01-26 PROCEDURE — 85025 COMPLETE CBC W/AUTO DIFF WBC: CPT

## 2021-01-26 PROCEDURE — 82728 ASSAY OF FERRITIN: CPT

## 2021-01-26 PROCEDURE — 85007 BL SMEAR W/DIFF WBC COUNT: CPT

## 2021-01-26 PROCEDURE — 80061 LIPID PANEL: CPT

## 2021-01-26 PROCEDURE — 80053 COMPREHEN METABOLIC PANEL: CPT

## 2022-04-04 ENCOUNTER — HOSPITAL ENCOUNTER (OUTPATIENT)
Dept: HOSPITAL 5 - SLR | Age: 52
Discharge: HOME | End: 2022-04-04
Attending: SURGERY
Payer: MEDICAID

## 2022-04-04 DIAGNOSIS — G47.30: Primary | ICD-10-CM

## 2022-04-04 PROCEDURE — 95810 POLYSOM 6/> YRS 4/> PARAM: CPT

## 2022-04-21 ENCOUNTER — HOSPITAL ENCOUNTER (OUTPATIENT)
Dept: HOSPITAL 5 - FLUORO | Age: 52
Discharge: HOME | End: 2022-04-21
Attending: SURGERY
Payer: MEDICAID

## 2022-04-21 DIAGNOSIS — K30: ICD-10-CM

## 2022-04-21 DIAGNOSIS — K21.9: Primary | ICD-10-CM

## 2022-04-21 PROCEDURE — 74246 X-RAY XM UPR GI TRC 2CNTRST: CPT

## 2022-04-21 NOTE — FLUOROSCOPY REPORT
UPPER GI



INDICATION / CLINICAL INFORMATION: K30 FUNCTIONAL DYSPEPSIA



TECHNIQUE: Upper GI exam was performed with double contrast barium and air.



COMPARISON: None available



FINDINGS:



MOTILITY: Tertiary contractions were identified. 

MUCOSA: No significant abnormality.

MASS: None.

STRICTURE: None.

HIATAL HERNIA: Several of the images demonstrate the gastric cardia above the diaphragm suggesting a 
sliding hiatal hernia.

REFLUX: Reflux is noted to the vineet.



STOMACH: Patient status post lap band procedure. The majority of the stomach is distal to the lap ban
d with only a small portion of the gastric cardia superior to the lap band.

DUODENUM: No significant abnormality.



ADDITIONAL FINDINGS:  view demonstrates abnormal orientation of the lap band suggesting slippage
.



Fluoroscopy Time: 5 minutes. Fluoroscopy Images: 22.



IMPRESSION:

1. Abnormal orientation of the lap band suggesting slippage. Additionally, during fluoroscopic examin
ation the majority of the stomach is below the lap band. Several images demonstrate the gastric cardi
a above the diaphragm suggesting a sliding hiatal hernia. Additionally, there is moderate reflux with
 tertiary contractions and delayed emptying of contrast from the distal esophagus into the stomach.



Signer Name: Abraham Amin DO 

Signed: 4/21/2022 2:19 PM

Workstation Name: [id number]

## 2022-05-17 ENCOUNTER — HOSPITAL ENCOUNTER (OUTPATIENT)
Dept: HOSPITAL 5 - GIO | Age: 52
Discharge: HOME | End: 2022-05-17
Attending: SURGERY
Payer: MEDICAID

## 2022-05-17 VITALS — DIASTOLIC BLOOD PRESSURE: 84 MMHG | SYSTOLIC BLOOD PRESSURE: 129 MMHG

## 2022-05-17 DIAGNOSIS — Z90.710: ICD-10-CM

## 2022-05-17 DIAGNOSIS — I25.10: ICD-10-CM

## 2022-05-17 DIAGNOSIS — M06.9: ICD-10-CM

## 2022-05-17 DIAGNOSIS — J45.909: ICD-10-CM

## 2022-05-17 DIAGNOSIS — H40.9: ICD-10-CM

## 2022-05-17 DIAGNOSIS — Z98.890: ICD-10-CM

## 2022-05-17 DIAGNOSIS — Z87.19: ICD-10-CM

## 2022-05-17 DIAGNOSIS — E66.01: ICD-10-CM

## 2022-05-17 DIAGNOSIS — K29.70: ICD-10-CM

## 2022-05-17 DIAGNOSIS — Z79.82: ICD-10-CM

## 2022-05-17 DIAGNOSIS — Z87.01: ICD-10-CM

## 2022-05-17 DIAGNOSIS — Z88.8: ICD-10-CM

## 2022-05-17 DIAGNOSIS — Z79.899: ICD-10-CM

## 2022-05-17 DIAGNOSIS — G47.30: ICD-10-CM

## 2022-05-17 DIAGNOSIS — K21.9: Primary | ICD-10-CM

## 2022-05-17 PROCEDURE — 43239 EGD BIOPSY SINGLE/MULTIPLE: CPT

## 2022-05-17 PROCEDURE — 88305 TISSUE EXAM BY PATHOLOGIST: CPT

## 2022-05-17 PROCEDURE — 88342 IMHCHEM/IMCYTCHM 1ST ANTB: CPT

## 2022-05-17 NOTE — OPERATIVE REPORT
Operative Report


Operative Report: 





DATE: 5/17/2022





SURGERY:  Upper endoscopy.


 


SURGEON:  Kush Kelley M.D.





PROCEDURE: EGD with biopsy


 


 PRE OP DX: morbid obesity, GERD, hx of gastric banding


 


POST OP DX: morbid obesity, GERD, hx of gastric banding


 


TYPE OF ANESTHESIA:  MAC.


 


ESTIMATED BLOOD LOSS:  None.


 


COMPLICATIONS:  None.


 


SPECIMENS REMOVED: antral biopsy


 


FINDINGS:


1. Normal banded gastric anatomy


2. mild antral gastritis


 


INDICATIONS:INDICATION FOR PROCEDURE:  Patient is a 52-year-old female with a 

long history of morbid obesity.  He has a hx of gastric banding and increased 

reflux. He is having EGD to evaluate his stomach anatomy prior to planning 

switching to another bariatric procedure. 


 


PROCEDURE DETAILS: After consent was reviewed, patient was taken back to


the operating room where patient was placed in the left lateral decubitus 


position and a bite block was placed in the mouth.  After a time-out was


called, MAC anesthesia was initiated.  I then passed the endoscope into his


oropharynx, into her esophagus, visualized the entire esophagus, which was all


within normal limits. Z-line was noted to about 38cm from incisors.  A small 

food bolus was noted proximal to the band.  There was an expected proximal 

stomach narrowing from external compression from the gastric band.  This lumen 

was traversed without any difficulty.  I then visualized the stomach and the 

first portion of


the duodenum and there were no abnormalities I could clearly visualize. A cold 

forceps biopsy of the antrum was taken and will be sent to pathology to evaluate

for H.pylori.  I


then retroflexed the scope in the stomach and visualized the underside of the 

band. There were no signs of band erosion or malposition.  I then desufflated 

the stomach and


removed the endoscope.  Patient tolerated procedure well and was transferred


to recovery room in good and stable condition.

## 2022-05-17 NOTE — ANESTHESIA CONSULTATION
Anesthesia Consult and Med Hx


Date of service: 05/17/22





- Airway


Anesthetic Teeth Evaluation: Good


ROM Head & Neck: Adequate


Mental/Hyoid Distance: Adequate


Mallampati Class: Class III


Intubation Access Assessment: Good





- Pulmonary Exam


CTA: Yes





- Cardiac Exam


Cardiac Exam: RRR





- Pre-Operative Health Status


ASA Pre-Surgery Classification: ASA3


Proposed Anesthetic Plan: MAC





- Pulmonary


Hx Smoking: No


Hx Asthma: Yes


COPD: No


Hx Pneumonia: Yes


Hx Sleep Apnea: Yes (on O2 2L at night, CPAP pending)





- Cardiovascular System


Hx Hypertension: Yes


Hx Coronary Artery Disease: Yes


Hx Heart Attack/AMI: Yes (3/17)


Hx Percutaneous Transluminal Coronary Angioplasty (PTCA): No





- Central Nervous System


Hx Neuromuscular Disorder: Yes (RA)


Hx Seizures: No


CVA: No





- Endocrine


Hx Renal Disease: No


Hx End Stage Renal Disease: No


Hx Liver Disease: No


Hx Non-Insulin Dependent Diabetes: No





- Other Systems


Hx Obesity: Yes

## 2022-05-17 NOTE — DISCHARGE SUMMARY
Providers





- Providers


Date of Admission: 


5/17/2022


Date of discharge: 05/17/22


Attending physician: 


ALVINA REAL MD





Primary care physician: 


TORREY JUAREZ








Hospitalization


Reason for admission: pre-op egd


Condition: Good


Procedures: 





egd with bx


Hospital course: 





Pt presented for a pre-op EGD as part of planning for up coming bariatric 

surgery. Procedure was uneventful and pt recovered well and was discharged to 

home.


Disposition: 01 HOME / SELF CARE / HOMELESS


Final Discharge Diagnosis (Prints w/discharge instructions): morbid obesity, 

gerd, hx bariatric surgery





Core Measure Documentation





- Palliative Care


Palliative Care/ Comfort Measures: Not Applicable





- Core Measures


Any of the following diagnoses?: none





Exam





- Physical Exam


Narrative exam: 





unchanged from pre-op





Plan


Activity: advance as tolerated


Diet: low carbohydrate


Follow up with: 


TORREY JUAREZ MD [Primary Care Provider] - 7 Days

## 2022-05-27 LAB
ALBUMIN SERPL-MCNC: 4.2 G/DL (ref 3.9–5)
ALT SERPL-CCNC: 17 UNITS/L (ref 7–56)
BUN SERPL-MCNC: 28 MG/DL (ref 7–17)
BUN/CREAT SERPL: 22 %
CALCIUM SERPL-MCNC: 10.2 MG/DL (ref 8.4–10.2)
HCT VFR BLD CALC: 46.2 % (ref 30.3–42.9)
HEMOLYSIS INDEX: 15
HGB BLD-MCNC: 15.1 GM/DL (ref 10.1–14.3)
MCHC RBC AUTO-ENTMCNC: 33 % (ref 30–34)
MCV RBC AUTO: 77 FL (ref 79–97)
PLATELET # BLD: 944 K/MM3 (ref 140–440)
RBC # BLD AUTO: 6.01 M/MM3 (ref 3.65–5.03)

## 2022-05-27 NOTE — ANESTHESIA CONSULTATION
Anesthesia Consult and Med Hx


Date of service: 06/06/22





- Airway


Anesthetic Teeth Evaluation: Good


ROM Head & Neck: Adequate


Mental/Hyoid Distance: Adequate


Mallampati Class: Class III


Intubation Access Assessment: Possibly Difficult





- Pulmonary Exam


CTA: Yes





- Cardiac Exam


Cardiac Exam: RRR





- Pre-Operative Health Status


ASA Pre-Surgery Classification: ASA3


Proposed Anesthetic Plan: General





- Pulmonary


Hx Smoking: No


Hx Asthma: Yes (PFTs on chart, started on INH by pulmonologist)


Hx Respiratory Symptoms: No


Hx Sleep Apnea: No (neg sleep study)





- Cardiovascular System


Hx Hypertension: Yes


Hx Coronary Artery Disease: Yes (recent neg ST w/ normal EF)


Hx Heart Attack/AMI: Yes (2017 w/ med management only)


Hx Percutaneous Transluminal Coronary Angioplasty (PTCA): No


Hx Cardia Arrhythmia: No





- Central Nervous System


Hx Neuromuscular Disorder: No (fibromyalgia)


CVA: No


Hx Back Pain: Yes (sciatic nerve pain)





- Gastrointestinal


Hx Gastroesophageal Reflux Disease: Yes





- Endocrine


Hx Renal Disease: Yes (CKD, follows with nephrologist)


Hx Liver Disease: No


Hx Insulin Dependent Diabetes: No


Hx Non-Insulin Dependent Diabetes: No


Hx Thyroid Disease: No





- Hematic


Hx Anemia: No (polycythemia vera; last dose ASA 5/23/22.)





- Other Systems


Hx Obesity: Yes (BMI 38)





- Additional Comments


Anesthesia Medical History Comments: No hx anesthetic complications. Preop 

cardiac, pulmonology, and medical evals on chart reviewed. Most recent 

hematology office note 2/2021 reviewed: patient was to continue w/ ASA, hydrea 

w/ close follow up for possible theraopuetic phlebotomy. She has not followed 

up, is no longer taking hydrea, and labs drawn today show elevated H/H w/ 

signficant thrombocytopenia. Patient instructed that she will need eval and 

optimization by hematologist prior to surgery. Surgeon's office notified.

## 2022-06-06 ENCOUNTER — HOSPITAL ENCOUNTER (INPATIENT)
Dept: HOSPITAL 5 - 3A | Age: 52
LOS: 5 days | Discharge: HOME | DRG: 326 | End: 2022-06-11
Attending: SURGERY | Admitting: SURGERY
Payer: MEDICAID

## 2022-06-06 DIAGNOSIS — I95.9: ICD-10-CM

## 2022-06-06 DIAGNOSIS — I12.9: ICD-10-CM

## 2022-06-06 DIAGNOSIS — K21.9: Primary | ICD-10-CM

## 2022-06-06 DIAGNOSIS — K95.89: ICD-10-CM

## 2022-06-06 DIAGNOSIS — N17.0: ICD-10-CM

## 2022-06-06 DIAGNOSIS — K91.840: ICD-10-CM

## 2022-06-06 DIAGNOSIS — I25.10: ICD-10-CM

## 2022-06-06 DIAGNOSIS — E66.01: ICD-10-CM

## 2022-06-06 DIAGNOSIS — D64.9: ICD-10-CM

## 2022-06-06 DIAGNOSIS — I25.2: ICD-10-CM

## 2022-06-06 DIAGNOSIS — N18.9: ICD-10-CM
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
## 2022-06-06 PROCEDURE — 0DP64CZ REMOVAL OF EXTRALUMINAL DEVICE FROM STOMACH, PERCUTANEOUS ENDOSCOPIC APPROACH: ICD-10-PCS | Performed by: SURGERY

## 2022-06-06 PROCEDURE — C9113 INJ PANTOPRAZOLE SODIUM, VIA: HCPCS

## 2022-06-06 PROCEDURE — 0DN64ZZ RELEASE STOMACH, PERCUTANEOUS ENDOSCOPIC APPROACH: ICD-10-PCS | Performed by: SURGERY

## 2022-06-06 PROCEDURE — 83735 ASSAY OF MAGNESIUM: CPT

## 2022-06-06 PROCEDURE — 82570 ASSAY OF URINE CREATININE: CPT

## 2022-06-06 PROCEDURE — 85025 COMPLETE CBC W/AUTO DIFF WBC: CPT

## 2022-06-06 PROCEDURE — 36415 COLL VENOUS BLD VENIPUNCTURE: CPT

## 2022-06-06 PROCEDURE — 81001 URINALYSIS AUTO W/SCOPE: CPT

## 2022-06-06 PROCEDURE — 85610 PROTHROMBIN TIME: CPT

## 2022-06-06 PROCEDURE — 86850 RBC ANTIBODY SCREEN: CPT

## 2022-06-06 PROCEDURE — 84300 ASSAY OF URINE SODIUM: CPT

## 2022-06-06 PROCEDURE — 84156 ASSAY OF PROTEIN URINE: CPT

## 2022-06-06 PROCEDURE — 80053 COMPREHEN METABOLIC PANEL: CPT

## 2022-06-06 PROCEDURE — 86920 COMPATIBILITY TEST SPIN: CPT

## 2022-06-06 PROCEDURE — 88300 SURGICAL PATH GROSS: CPT

## 2022-06-06 PROCEDURE — 94640 AIRWAY INHALATION TREATMENT: CPT

## 2022-06-06 PROCEDURE — 85014 HEMATOCRIT: CPT

## 2022-06-06 PROCEDURE — 84100 ASSAY OF PHOSPHORUS: CPT

## 2022-06-06 PROCEDURE — 3E03317 INTRODUCTION OF OTHER THROMBOLYTIC INTO PERIPHERAL VEIN, PERCUTANEOUS APPROACH: ICD-10-PCS | Performed by: SURGERY

## 2022-06-06 PROCEDURE — P9016 RBC LEUKOCYTES REDUCED: HCPCS

## 2022-06-06 PROCEDURE — 85027 COMPLETE CBC AUTOMATED: CPT

## 2022-06-06 PROCEDURE — 86901 BLOOD TYPING SEROLOGIC RH(D): CPT

## 2022-06-06 PROCEDURE — 0D164ZA BYPASS STOMACH TO JEJUNUM, PERCUTANEOUS ENDOSCOPIC APPROACH: ICD-10-PCS | Performed by: SURGERY

## 2022-06-06 PROCEDURE — 80048 BASIC METABOLIC PNL TOTAL CA: CPT

## 2022-06-06 PROCEDURE — 0WCG4ZZ EXTIRPATION OF MATTER FROM PERITONEAL CAVITY, PERCUTANEOUS ENDOSCOPIC APPROACH: ICD-10-PCS | Performed by: SURGERY

## 2022-06-06 PROCEDURE — 85018 HEMOGLOBIN: CPT

## 2022-06-06 PROCEDURE — 86900 BLOOD TYPING SEROLOGIC ABO: CPT

## 2022-06-06 RX ADMIN — ACETAMINOPHEN NR MLS/HR: 10 INJECTION, SOLUTION INTRAVENOUS at 21:13

## 2022-06-06 RX ADMIN — ACETAMINOPHEN SCH MLS/HR: 10 INJECTION, SOLUTION INTRAVENOUS at 14:00

## 2022-06-06 RX ADMIN — SODIUM CHLORIDE, SODIUM LACTATE, POTASSIUM CHLORIDE, AND CALCIUM CHLORIDE SCH MLS/HR: .6; .31; .03; .02 INJECTION, SOLUTION INTRAVENOUS at 07:30

## 2022-06-06 RX ADMIN — METRONIDAZOLE SCH MLS/HR: 5 INJECTION, SOLUTION INTRAVENOUS at 22:04

## 2022-06-06 RX ADMIN — CEFAZOLIN SCH MLS/HR: 330 INJECTION, POWDER, FOR SOLUTION INTRAMUSCULAR; INTRAVENOUS at 16:54

## 2022-06-06 RX ADMIN — SODIUM CHLORIDE, SODIUM LACTATE, POTASSIUM CHLORIDE, AND CALCIUM CHLORIDE SCH MLS/HR: .6; .31; .03; .02 INJECTION, SOLUTION INTRAVENOUS at 16:23

## 2022-06-06 RX ADMIN — METRONIDAZOLE SCH MLS/HR: 5 INJECTION, SOLUTION INTRAVENOUS at 16:31

## 2022-06-06 RX ADMIN — FENTANYL CITRATE PRN MCG: 50 INJECTION, SOLUTION INTRAMUSCULAR; INTRAVENOUS at 14:05

## 2022-06-06 RX ADMIN — ACETAMINOPHEN SCH: 10 INJECTION, SOLUTION INTRAVENOUS at 21:28

## 2022-06-06 RX ADMIN — ACETAMINOPHEN NR MLS/HR: 10 INJECTION, SOLUTION INTRAVENOUS at 07:35

## 2022-06-06 RX ADMIN — PANTOPRAZOLE SODIUM SCH MG: 40 INJECTION, POWDER, FOR SOLUTION INTRAVENOUS at 16:23

## 2022-06-06 RX ADMIN — SIMETHICONE PRN MG: 80 TABLET, CHEWABLE ORAL at 22:04

## 2022-06-06 RX ADMIN — FENTANYL CITRATE PRN MCG: 50 INJECTION, SOLUTION INTRAMUSCULAR; INTRAVENOUS at 14:21

## 2022-06-06 RX ADMIN — HYDROMORPHONE HYDROCHLORIDE PRN MG: 1 INJECTION, SOLUTION INTRAMUSCULAR; INTRAVENOUS; SUBCUTANEOUS at 22:05

## 2022-06-06 NOTE — OPERATIVE REPORT
Operative Report


Operative Report: 





DATE OF PROCEDURE:  6/6/2022


 


SURGEON:  Kush Kelley M.D.


 


ASSISTANT:  Amee Matthews CSA MD





PREOPERATIVE DIAGNOSIS:  GERD, hx of gastric banding


 


POSTOPERATIVE DIAGNOSES:  GERD, hx of gastric banding


 


PROCEDURES PERFORMED:


1.  Laparoscopic gastric bypass.


2. Laparoscopic removal of gastric banding system


3. Extensive lysis of adhesions





 


ANESTHESIA:  General endotracheal tube intubation, TAP block


 


SPECIMENS:  gastric band system


 


ESTIMATED BLOOD LOSS:  Less than 20 mL.


 


FINDINGS:  Dense scar tissue around gastric band





COMPLICATIONS:  None immediate


 


INDICATION:  Ms. Gómez is a 52-year-old female with history of lap gastric 

banding for the treatment of morbid obesity. Pt has had GERD that is not 

improved with PPI. she was also found to have her band was slipped out of its 

optimal position. She signed


informed consent and expressed understanding of risks and benefits.








DESCRIPTION OF PROCEDURE:  Patient was brought to the OR suite, laid in supine


position.  Bilateral lower extremity SCDs were placed.  General anesthesia was


induced via successful endotracheal tube intubation.  Patient's abdomen was 

prepped and draped


in sterile fashion. A veress needle was used to insuflate the abdomen to a 

pressure of 15 mmHg in the left subcostal region. Using Optiview technique, a 5-

mm trocar was placed into the abdominal


cavity under direct vision just superior and to the left of the umbilicus.  

There was noted to be no gross injury to any intraabdominal


structures.   15 mm


in the right mid abdomen mid clavicular line and three 5-mm trocars in the


right upper quadrant, epigastric areas were placed under direct visualization. 

The band was encountered at the proximal stomach and adhesed to the underside 

liver.  Using electrocautery and harmonic scalpel the surrounding adhesions were

taken down.  The band was unclasped and removed from around the stomach. It took

over an hour to separate the gastro-gastric sutures, as well as separate the 

stomach from the underside of the liver to allow the stomach to resume normal 

anatomy. the tubing of the banding system was cut to allow the removal of the 

band from the 15mm port.  


At this time, the ligament of Treitz identified and followed down


approximately 50 cm and the jejunum was transected.


 


 The distal segment of jejunum was then traced for approximately 75 cm and a


stable side-to-side jejunojejunostomy was performed.  The common enterotomy


was closed with 2 firings of the endoscopic stapler.  The mesenteric


defect was closed with running Surgidac suture.  This anastomosis was found to


be patent without kink, obstruction or bleeding.  At this time, the patient


was placed in steep reverse Trendelenburg position.  A liver retractor was


placed through the epigastric port to elevate the left lateral lobe of the


liver.  


 


A small gastric pouch was formed with serial firings of gold loads on a 

laparoscopic stapler.  The Manuel limb was then brought in


an antegastric antecolic fashion and secured with 2 stay sutures to the


gastric pouch.  After this, the enterotomies were made with Harmonic scalpel,


and a side-to-side stapled gastrojejunostomy was performed with a mechanical


stapler.  After this, a 2-layer running closure using absorbable V-lock suture 

were


done, the first being mucosal approximation prior to completion of the first


layer.  Then I passed and an EGD scope beyond the anastomosis to act


as a stent.  The first layer was completed, the second was then performed.  

After


this, the EGD was retracted slightly.  A bowel clamp was placed in a proximal


Manuel limb.  The anastomosis was submerged under saline.  Via intraluminal EGD


insufflation, there was noted be no bubbles in the saline indicating an


airtight anastomosis.  There was noted to be no obstruction or bleeding


intraluminally in the pouch or the anastomosis.  At this time, the scope was


removed.  The saline was aspirated.  Vistaseal was placed over the anastomosis.


All trocars were removed under direct visualization and the abdomen was then


desufflated.  A 19Fr round drain was placed in the LUQ and exited our of the 

left side.  A TAP block was performed using a total of 60 mL 0.25% Marcaine 

along bilateral mid axillary lines starting at the subcostal margin at the level

of the umbilicus.  The 12mm trocar site was closed using POD and a Luis 

Ivan device for fear that after surgery it become incarcerated. the port the

palpation just above the umbilicus. The skin through a previous scar and the 

sub-q was dissected down the port with electro cautery. It was excised from the 

abdominal wall fascia. The pocket was closed with O-vicryl. The skin incisions 

were closed with 4-0 Monocryl followed by Dermabond dressings.  Patient was 

awoken and taken to recovery


in stable condition.  All counts were correct.

## 2022-06-07 LAB
ALBUMIN SERPL-MCNC: 3.3 G/DL (ref 3.9–5)
ALT SERPL-CCNC: 40 UNITS/L (ref 7–56)
BASOPHILS # (AUTO): 0 K/MM3 (ref 0–0.1)
BASOPHILS NFR BLD AUTO: 0.1 % (ref 0–1.8)
BILIRUB UR QL STRIP: (no result)
BLOOD UR QL VISUAL: (no result)
BUN SERPL-MCNC: 47 MG/DL (ref 7–17)
BUN/CREAT SERPL: 25 %
CALCIUM SERPL-MCNC: 9 MG/DL (ref 8.4–10.2)
CREATININE,URINE: 96.9 MG/DL (ref 0.1–20)
EOSINOPHIL # BLD AUTO: 0 K/MM3 (ref 0–0.4)
EOSINOPHIL NFR BLD AUTO: 0 % (ref 0–4.3)
HCT VFR BLD CALC: 36.3 % (ref 30.3–42.9)
HCT VFR BLD CALC: 37.8 % (ref 30.3–42.9)
HEMOLYSIS INDEX: 4
HGB BLD-MCNC: 11.7 GM/DL (ref 10.1–14.3)
HGB BLD-MCNC: 11.8 GM/DL (ref 10.1–14.3)
INR PPP: 1.07 (ref 0.87–1.13)
LYMPHOCYTES # BLD AUTO: 0.6 K/MM3 (ref 1.2–5.4)
LYMPHOCYTES NFR BLD AUTO: 4.4 % (ref 13.4–35)
MCHC RBC AUTO-ENTMCNC: 32 % (ref 30–34)
MCV RBC AUTO: 77 FL (ref 79–97)
MONOCYTES # (AUTO): 0.9 K/MM3 (ref 0–0.8)
MONOCYTES % (AUTO): 6.6 % (ref 0–7.3)
PH UR STRIP: 5 [PH] (ref 5–7)
PLATELET # BLD: 924 K/MM3 (ref 140–440)
PROT UR STRIP-MCNC: (no result) MG/DL
PROTEIN/CREATININE RATIO,URINE: 0.1
RBC # BLD AUTO: 4.74 M/MM3 (ref 3.65–5.03)
RBC #/AREA URNS HPF: 1 /HPF (ref 0–6)
UROBILINOGEN UR-MCNC: < 2 MG/DL (ref ?–2)
WBC #/AREA URNS HPF: 1 /HPF (ref 0–6)

## 2022-06-07 RX ADMIN — CEFAZOLIN SCH MLS/HR: 330 INJECTION, POWDER, FOR SOLUTION INTRAMUSCULAR; INTRAVENOUS at 00:39

## 2022-06-07 RX ADMIN — METRONIDAZOLE SCH MLS/HR: 5 INJECTION, SOLUTION INTRAVENOUS at 06:26

## 2022-06-07 RX ADMIN — FENTANYL CITRATE PRN MCG: 50 INJECTION, SOLUTION INTRAMUSCULAR; INTRAVENOUS at 17:10

## 2022-06-07 RX ADMIN — HYDROCODONE BITARTRATE AND ACETAMINOPHEN PRN MG: 7.5; 325 SOLUTION ORAL at 09:32

## 2022-06-07 RX ADMIN — FENTANYL CITRATE PRN MCG: 50 INJECTION, SOLUTION INTRAMUSCULAR; INTRAVENOUS at 16:40

## 2022-06-07 RX ADMIN — ACETAMINOPHEN SCH MLS/HR: 10 INJECTION, SOLUTION INTRAVENOUS at 08:19

## 2022-06-07 RX ADMIN — METRONIDAZOLE SCH MLS/HR: 5 INJECTION, SOLUTION INTRAVENOUS at 18:19

## 2022-06-07 RX ADMIN — ACETAMINOPHEN SCH MLS/HR: 10 INJECTION, SOLUTION INTRAVENOUS at 18:19

## 2022-06-07 RX ADMIN — CEFAZOLIN SCH MLS/HR: 330 INJECTION, POWDER, FOR SOLUTION INTRAMUSCULAR; INTRAVENOUS at 21:26

## 2022-06-07 RX ADMIN — PANTOPRAZOLE SODIUM SCH MG: 40 INJECTION, POWDER, FOR SOLUTION INTRAVENOUS at 09:30

## 2022-06-07 RX ADMIN — HYDROMORPHONE HYDROCHLORIDE PRN MG: 1 INJECTION, SOLUTION INTRAMUSCULAR; INTRAVENOUS; SUBCUTANEOUS at 18:20

## 2022-06-07 NOTE — OPERATIVE REPORT
Operative Report


Operative Report: 


Date: June 7, 2022





Surgeon: Kush Kelley MD





Assistant surgeon:Roxana Roman DO





Procedure:1.  Diagnostic laparoscopy, 2.  Evacuation of intra-abdominal hematoma





Anesthesia:GETA





Preop diagnosis: Symptomatic intra-abdominal postoperative bleed





Postop diagnosis: Same as preop





Indication: Patient is a 52-year-old female who had laparoscopic removal of 

gastric band and conversion to gastric bypass.  Intraoperatively during that 

procedure she was noted to have generalized oozing.  A drain was placed after 

surgery.  Patient was noted to have significant bloody drainage from her MARYANNE 

drain as well as bouts of hypotension to his low as in the 50s systolic.  It was

decided to take the patient to the OR distal her abdomen.  Patient signed 

informed consent.





Details of procedure: Patient was brought in the OR suite and laid in supine 

position.  Bilateral lower extremity SCDs were placed.  General anesthesia was 

induced via successful endotracheal tube intubation.  Patient abdomen was 

prepped and draped in sterile fashion.  Going through her previous incisions her

abdomen was insufflated to a pressure of 15 mmHg followed by Optiview technique 

a 5 mm trocar.  When inside the abdominal cavity there was noted to be no gross 

intra-abdominal injury due to insertion of trochars.  For working trochars were 

placed under direct visualization.  There was noted to be significant clot 

throughout the abdomen most significantly in the left upper quadrant.  A liver 

retractor was used to elevate the liver and the patient was placed in reverse in

the liver position there was evacuation of clot in the left upper quadrant and a

clot in the mid abdomen.  There was no active bleeding appreciated requiring any

ligation.  There was a general ooze at the staple line in the left upper 

quadrant.  Surgicel cloth and powder were placed in the left upper quadrant.  

Patient was also noted to have a small contained rectus sheath hematoma in the 

right mid abdomen through her previous 12 mm trocar, as well as small peritoneal

hematomas at all of her previous trocar sites.  Once the abdomen was irrigated 

and aspirated of all bloody fluid and all clot the drain left in the abdomen was

repositioned back to left upper quadrant.  The abdomen was desufflated after the

12 mm trocar space was closed with a Luis Quiñonez device and a 0 Vicryl.  All

skin incisions were closed with 4 Monocryl followed by Dermabond.  Lidocaine and

Marcaine were placed at all incision sites.  Patient remained stable throughout 

the entire case she was awoken taken to recovery stable condition.





Complications: None immediate





EBL: Minimal new blood from this procedure





Findings: Intra-abdominal hematoma clot most concentrated in the left upper 

quadrant, slight oozing at the staple line and left upper quadrant.  Overall 

general oozing from raw tissue surfaces.  Small peritoneal hematomas at trocar 

sites





Specimens: None

## 2022-06-07 NOTE — PROGRESS NOTE
Assessment and Plan





POD#1 s/p laparoscopic removal of gastric banding and conversion to gastric 

bypass.  Patient is afebrile and stable with systolic blood pressure in the low 

100s.  Slow oozing likely from staple line and contributed by the fact patient 

had not discontinued aspirin 7 days prior to surgery.  Coags were checked and 

found to be within normal limits.  We will bolus IV fluids and consult renal as 

her BUN and creatinine has elevated.  Patient has chronic renal insufficiency.  

We will continue to closely monitor and repeat labs in a.m.





Subjective


Date of service: 06/07/22


Narrative: 





No acute events overnight.  Patient says that she has right-sided abdominal pain

and requesting pain medication.  Patient denies any nausea or vomiting.





Objective


                               Vital Signs - 12hr











  06/06/22 06/07/22 06/07/22





  23:25 03:56 06:00


 


Temperature 98.7 F 98.6 F 


 


Pulse Rate 74 76 


 


Respiratory 16 16 18





Rate   


 


Blood Pressure 87/42 94/54 


 


O2 Sat by Pulse 94 91 100





Oximetry   














  06/07/22 06/07/22





  08:15 10:00


 


Temperature 98.9 F 


 


Pulse Rate 61 


 


Respiratory  





Rate  


 


Blood Pressure 100/57 


 


O2 Sat by Pulse 96 96





Oximetry  














- General physical appearance


well developed, no distress, moderate pain





- Eyes


PERRL





- ENT


no hearing loss





- Abdomen


soft, not guarding, other (incisions c/d/i, MARYANNE drain with sanguinous fluid, 

300cc recorded overnight)





- Labs





                                 06/07/22 04:09





                                 06/07/22 04:09


                                 Diabetes panel











  06/07/22 Range/Units





  04:09 


 


Sodium  137  (137-145)  mmol/L


 


Potassium  3.8  (3.6-5.0)  mmol/L


 


Chloride  101.0  ()  mmol/L


 


Carbon Dioxide  24  (22-30)  mmol/L


 


BUN  47 H  (7-17)  mg/dL


 


Creatinine  1.9 H  (0.6-1.2)  mg/dL


 


Glucose  119 H  ()  mg/dL


 


Calcium  9.0  (8.4-10.2)  mg/dL


 


AST  51 H  (5-40)  units/L


 


ALT  40  (7-56)  units/L


 


Alkaline Phosphatase  45  ()  units/L


 


Total Protein  6.3  (6.3-8.2)  g/dL


 


Albumin  3.3 L  (3.9-5)  g/dL








                                  Calcium panel











  06/07/22 Range/Units





  04:09 


 


Calcium  9.0  (8.4-10.2)  mg/dL


 


Albumin  3.3 L  (3.9-5)  g/dL








                                 Pituitary panel











  06/07/22 Range/Units





  04:09 


 


Sodium  137  (137-145)  mmol/L


 


Potassium  3.8  (3.6-5.0)  mmol/L


 


Chloride  101.0  ()  mmol/L


 


Carbon Dioxide  24  (22-30)  mmol/L


 


BUN  47 H  (7-17)  mg/dL


 


Creatinine  1.9 H  (0.6-1.2)  mg/dL


 


Glucose  119 H  ()  mg/dL


 


Calcium  9.0  (8.4-10.2)  mg/dL








                                  Adrenal panel











  06/07/22 Range/Units





  04:09 


 


Sodium  137  (137-145)  mmol/L


 


Potassium  3.8  (3.6-5.0)  mmol/L


 


Chloride  101.0  ()  mmol/L


 


Carbon Dioxide  24  (22-30)  mmol/L


 


BUN  47 H  (7-17)  mg/dL


 


Creatinine  1.9 H  (0.6-1.2)  mg/dL


 


Glucose  119 H  ()  mg/dL


 


Calcium  9.0  (8.4-10.2)  mg/dL


 


Total Bilirubin  0.40  (0.1-1.2)  mg/dL


 


AST  51 H  (5-40)  units/L


 


ALT  40  (7-56)  units/L


 


Alkaline Phosphatase  45  ()  units/L


 


Total Protein  6.3  (6.3-8.2)  g/dL


 


Albumin  3.3 L  (3.9-5)  g/dL

## 2022-06-07 NOTE — POST ANESTHESIA EVALUATION
- Post Anesthesia Evaluation


Patient Participated: Yes


Airway Patent: Yes


Stable Respiratory Function: Yes


Nausea/Vomiting: No


Temp > 96.8F: Yes


Pain Manageable: Yes


Adequeate Hydration: Yes


Anesthesia Complications: No


Other Comments: HD stable, no pressors. H/H stable at preop baseline.

## 2022-06-07 NOTE — CONSULTATION
History of Present Illness





- Reason for Consult


Consult date: 06/07/22


acute renal failure





- History of Present Illness





52yr F s/p Laparoscopic Gastric Bypass with removal of prior Gastric banding & 

lysis of Adhesions. Renal consult for worsening BUN/Cr. Pt admits to prior Renal

Insufficiency





Past History


Past Medical History: CAD (S/p MI 2017), hypertension, other (Polycythemia Vera 

on Hydrea per Hematology)


Past Surgical History: Other (Prior Gastric Banding)


Social history: denies: smoking





Medications and Allergies


                                    Allergies











Allergy/AdvReac Type Severity Reaction Status Date / Time


 


duloxetine [From Cymbalta] Allergy  Nausea Verified 12/25/18 11:05


 


lisinopril AdvReac  Cough Verified 09/26/15 07:14











                                Home Medications











 Medication  Instructions  Recorded  Confirmed  Last Taken  Type


 


Losartan [Cozaar] 100 mg PO QDAY 08/03/17 06/06/22 06/05/22 09:00 History


 


Amlodipine Besylate [Norvasc] 2.5 mg PO DAILY 05/26/22 06/06/22 06/06/22 05:00 

History


 


Chlorthalidone [Thalitone] 25 mg PO QDAY 05/26/22 06/06/22 06/05/22 09:00 

History


 


HYDROcodone/APAP 7.5-325 [Norco 1 each PO Q8HR PRN 05/26/22 05/26/22 Unknown 

History





7.5/325]     


 


Omeprazole Magnesium [PriLOSEC Otc] 20 mg PO QDAY 05/26/22 06/06/22 06/05/22 09:

00 History











Active Meds: 


Active Medications





Hydrocodone Bitart/Acetaminophen (Hydrocodone/Acetaminophen [id number] Oral 

Liqd)  7.5 mg PO Q4H PRN


   PRN Reason: Pain, Moderate (4-6)


   Last Admin: 06/07/22 09:32 Dose:  7.5 mg


   


Hydralazine HCl (Hydralazine 20 Mg/1 Ml Inj)  10 mg IV Q6H PRN


   PRN Reason: SBP > 150


Hydromorphone HCl (Hydromorphone 0.5 Mg/0.5 Ml Inj)  0.5 mg IV Q3H PRN


   PRN Reason: Pain , Severe (7-10)


   Last Admin: 06/06/22 22:05 Dose:  0.5 mg


   


Lactated Ringer's (Lactated Ringers)  1,000 mls @ 150 mls/hr IV AS DIRECT SARITA


   Last Admin: 06/07/22 09:30 Dose:  150 mls/hr


   


Sodium Chloride (Nacl 0.9% 500 Ml)  500 mls @ 999 mls/hr IV ONCE@0730 [facility]


   Stop: 06/07/22 12:30


   Last Admin: 06/07/22 07:41 Dose:  999 mls/hr


   


Sodium Chloride (Nacl 0.9% 500 Ml)  500 mls @ 999 mls/hr IV ONCE@1000 [facility]


   Stop: 06/07/22 17:00


   Last Admin: 06/07/22 09:38 Dose:  999 mls/hr


   


Metoclopramide HCl (Metoclopramide 10 Mg/2 Ml Inj)  5 mg IV Q6H PRN


   PRN Reason: Nausea And Vomiting


Morphine Sulfate (Morphine 2 Mg/1 Ml Inj)  2 mg IV Q4H PRN


   PRN Reason: Pain, Moderate (4-6)


   Last Admin: 06/06/22 16:53 Dose:  2 mg


   


Ondansetron HCl (Ondansetron 4 Mg/2 Ml Inj)  4 mg IV Q4H PRN


   PRN Reason: Nausea And Vomiting


Pantoprazole Sodium (Pantoprazole 40 Mg Inj)  40 mg IV QDAY [facility]


   Last Admin: 06/07/22 09:30 Dose:  40 mg


   


Phenol (Phenol 1.4% 177 Ml Bottle)  1 spray MM PRN PRN


   PRN Reason: Sore Throat


Simethicone (Simethicone 80 Mg Chew Tab)  80 mg PO Q6H PRN


   PRN Reason: Gas pain


   Last Admin: 06/06/22 22:04 Dose:  80 mg


   











Review of Systems


Constitutional: no fever, no chills


Cardiovascular: no chest pain, no orthopnea, no palpitations, no edema, no leg 

edema


Respiratory: no cough, no shortness of breath


Gastrointestinal: no abdominal pain, no nausea, no vomiting, no diarrhea, no 

constipation





Exam





- Vital Signs


Vital signs: 


                                   Vital Signs











Temp Pulse Resp BP Pulse Ox


 


 98.2 F   69   16   134/95   96 


 


 05/27/22 12:30  05/27/22 12:30  05/27/22 12:30  05/27/22 12:30  05/27/22 12:30














- General Appearance


General appearance: well-developed


EENT: PERRL, hearing intact


Neck: Present: neck supple.  Absent: JVD/HJR


Respiratory: Clear to Ascultation


Heart: regular, S1S2


Gastrointestinal: Present: other (Dressing in place)


Integumentary: no rash, warm and dry


Neurologic: no focal deficit, alert and oriented x3


Psychiatric: mood/affect appropriate





Results





- Lab Results





                                 06/07/22 04:09





                                 06/07/22 04:09


                             Most recent lab results











Calcium  9.0 mg/dL (8.4-10.2)   06/07/22  04:09    














Assessment and Plan





KEVIN - Likely Prerenal Azotemia 2/2 Hypoperfusion & Dehydration





?Acute on CKD currently stage3





 Hypotension





Plan:





Spot Urine - UA/Micro, FENa, Prot/Cr ratio





Hold Renal u/s for now until abd surgery & inflammation subside





IVF & f/u labs





Review meds & adjust as necessary, hold BP meds, avoid nephrotoxic meds





Thanks very much, will f/u with you

## 2022-06-08 LAB
ALBUMIN SERPL-MCNC: 3.2 G/DL (ref 3.9–5)
ALT SERPL-CCNC: 39 UNITS/L (ref 7–56)
BASOPHILS # (AUTO): 0 K/MM3 (ref 0–0.1)
BASOPHILS NFR BLD AUTO: 0.4 % (ref 0–1.8)
BUN SERPL-MCNC: 39 MG/DL (ref 7–17)
BUN/CREAT SERPL: 22 %
CALCIUM SERPL-MCNC: 7.8 MG/DL (ref 8.4–10.2)
EOSINOPHIL # BLD AUTO: 0 K/MM3 (ref 0–0.4)
EOSINOPHIL NFR BLD AUTO: 0 % (ref 0–4.3)
HCT VFR BLD CALC: 31 % (ref 30.3–42.9)
HEMOLYSIS INDEX: 8
HGB BLD-MCNC: 9.9 GM/DL (ref 10.1–14.3)
LYMPHOCYTES # BLD AUTO: 0.8 K/MM3 (ref 1.2–5.4)
LYMPHOCYTES NFR BLD AUTO: 10.7 % (ref 13.4–35)
MCHC RBC AUTO-ENTMCNC: 32 % (ref 30–34)
MCV RBC AUTO: 77 FL (ref 79–97)
MONOCYTES # (AUTO): 0.9 K/MM3 (ref 0–0.8)
MONOCYTES % (AUTO): 12.9 % (ref 0–7.3)
PLATELET # BLD: 900 K/MM3 (ref 140–440)
RBC # BLD AUTO: 4 M/MM3 (ref 3.65–5.03)

## 2022-06-08 RX ADMIN — CEFAZOLIN SCH MLS/HR: 330 INJECTION, POWDER, FOR SOLUTION INTRAMUSCULAR; INTRAVENOUS at 03:56

## 2022-06-08 RX ADMIN — CEFAZOLIN SCH MLS/HR: 330 INJECTION, POWDER, FOR SOLUTION INTRAMUSCULAR; INTRAVENOUS at 11:05

## 2022-06-08 RX ADMIN — PANTOPRAZOLE SODIUM SCH MG: 40 INJECTION, POWDER, FOR SOLUTION INTRAVENOUS at 10:20

## 2022-06-08 RX ADMIN — METRONIDAZOLE SCH MLS/HR: 5 INJECTION, SOLUTION INTRAVENOUS at 18:43

## 2022-06-08 RX ADMIN — ACETAMINOPHEN SCH MLS/HR: 10 INJECTION, SOLUTION INTRAVENOUS at 06:44

## 2022-06-08 RX ADMIN — SODIUM CHLORIDE SCH MLS/HR: 0.9 INJECTION, SOLUTION INTRAVENOUS at 21:48

## 2022-06-08 RX ADMIN — HYDROMORPHONE HYDROCHLORIDE PRN MG: 1 INJECTION, SOLUTION INTRAMUSCULAR; INTRAVENOUS; SUBCUTANEOUS at 15:00

## 2022-06-08 RX ADMIN — METRONIDAZOLE SCH MLS/HR: 5 INJECTION, SOLUTION INTRAVENOUS at 10:21

## 2022-06-08 RX ADMIN — METRONIDAZOLE SCH MLS/HR: 5 INJECTION, SOLUTION INTRAVENOUS at 03:17

## 2022-06-08 RX ADMIN — HYDROMORPHONE HYDROCHLORIDE PRN MG: 1 INJECTION, SOLUTION INTRAMUSCULAR; INTRAVENOUS; SUBCUTANEOUS at 04:39

## 2022-06-08 RX ADMIN — ACETAMINOPHEN SCH MLS/HR: 10 INJECTION, SOLUTION INTRAVENOUS at 01:02

## 2022-06-08 RX ADMIN — HYDROMORPHONE HYDROCHLORIDE PRN MG: 1 INJECTION, SOLUTION INTRAMUSCULAR; INTRAVENOUS; SUBCUTANEOUS at 21:42

## 2022-06-08 RX ADMIN — CEFAZOLIN SCH MLS/HR: 330 INJECTION, POWDER, FOR SOLUTION INTRAMUSCULAR; INTRAVENOUS at 20:52

## 2022-06-08 RX ADMIN — ACETAMINOPHEN SCH MLS/HR: 10 INJECTION, SOLUTION INTRAVENOUS at 13:00

## 2022-06-08 NOTE — PROGRESS NOTE
Assessment and Plan





Postop day #2 status post removal of gastric band and conversion of gastric 

bypass.  Postop day 1 status post diagnostic laparoscopy for takeback due to 

bleeding and hypotension. (No discrete bleeding to control, patient had 

generalized oozing at all surgical sites including trocar sites) hypotension has

resolved patient is afebrile and stable with normalization of white blood cell 

count.


Will remove Mckeon and encourage ambulation.  Patient to start clear liquids.  


Acute on chronic renal insufficiency exacerbated by acute postsurgical blood 

loss.  Minimal downtrend in BUN and creatinine compared to yesterday.  Renal on 

board appreciate recommendations.  We will continue to avoid nephrotoxic agents 

and IV hydration.


We will recheck labs in AM.





Subjective


Date of service: 06/08/22


Narrative: 





No acute events overnight.  Patient says she still having abdominal pain but 

overall she feels better compared to before with her surgery yesterday.  Patient

denies any nausea or vomiting.  There has been much less drainage out of her MARYANNE 

drain compared to prior to yesterday's procedure.





Objective


                               Vital Signs - 12hr











  06/08/22 06/08/22 06/08/22





  03:00 05:13 08:08


 


Temperature  98.2 F 98.4 F


 


Pulse Rate  76 85


 


Respiratory 18 16 





Rate   


 


Blood Pressure  145/83 132/84


 


O2 Sat by Pulse 100 100 93





Oximetry   














- General physical appearance


well developed, no distress, moderate pain





- Eyes


PERRL





- ENT


no hearing loss





- Respiratory


normal expansion, normal respiratory effort





- Abdomen


soft, other (Incisions clean dry and intact, tender to palpation appropriate 

most notably over mid right abdominal incision where she has a small contained 

rectus sheath hematoma, MARYANNE drain minimal serosanguineous output)





- Labs





                                 06/08/22 04:59





                                 06/08/22 04:59


                                 Diabetes panel











  06/08/22 Range/Units





  04:59 


 


Sodium  143  (137-145)  mmol/L


 


Potassium  3.8  (3.6-5.0)  mmol/L


 


Chloride  109.8 H  ()  mmol/L


 


Carbon Dioxide  21 L  (22-30)  mmol/L


 


BUN  39 H  (7-17)  mg/dL


 


Creatinine  1.8 H  (0.6-1.2)  mg/dL


 


Glucose  111 H  ()  mg/dL


 


Calcium  7.8 L  (8.4-10.2)  mg/dL


 


AST  45 H  (5-40)  units/L


 


ALT  39  (7-56)  units/L


 


Alkaline Phosphatase  37  ()  units/L


 


Total Protein  6.4  (6.3-8.2)  g/dL


 


Albumin  3.2 L  (3.9-5)  g/dL








                                  Calcium panel











  06/08/22 Range/Units





  04:59 


 


Calcium  7.8 L  (8.4-10.2)  mg/dL


 


Albumin  3.2 L  (3.9-5)  g/dL








                                 Pituitary panel











  06/08/22 Range/Units





  04:59 


 


Sodium  143  (137-145)  mmol/L


 


Potassium  3.8  (3.6-5.0)  mmol/L


 


Chloride  109.8 H  ()  mmol/L


 


Carbon Dioxide  21 L  (22-30)  mmol/L


 


BUN  39 H  (7-17)  mg/dL


 


Creatinine  1.8 H  (0.6-1.2)  mg/dL


 


Glucose  111 H  ()  mg/dL


 


Calcium  7.8 L  (8.4-10.2)  mg/dL








                                  Adrenal panel











  06/08/22 Range/Units





  04:59 


 


Sodium  143  (137-145)  mmol/L


 


Potassium  3.8  (3.6-5.0)  mmol/L


 


Chloride  109.8 H  ()  mmol/L


 


Carbon Dioxide  21 L  (22-30)  mmol/L


 


BUN  39 H  (7-17)  mg/dL


 


Creatinine  1.8 H  (0.6-1.2)  mg/dL


 


Glucose  111 H  ()  mg/dL


 


Calcium  7.8 L  (8.4-10.2)  mg/dL


 


Total Bilirubin  0.40  (0.1-1.2)  mg/dL


 


AST  45 H  (5-40)  units/L


 


ALT  39  (7-56)  units/L


 


Alkaline Phosphatase  37  ()  units/L


 


Total Protein  6.4  (6.3-8.2)  g/dL


 


Albumin  3.2 L  (3.9-5)  g/dL

## 2022-06-08 NOTE — PROGRESS NOTE
Assessment and Plan


1. Acute kidney injury:


Vasomotor KEVIN superimposed on CKD.


UA bland.


ATN.


IV fluids.


Monitor renal function.


Renal prognosis is guarded.


Avoid nephrotoxic agents.


Meds dosage based on GFR.





2. FEN:


Monitor lytes and volume status.





3. S/p removal of gastric band and conversion of gastric bypass / S/p diagnostic

laparoscopy due to bleeding and hypotension:


Followed by General Surgery.





4. Hypertension:


Adjust meds as needed.


Monitor.





5. Morbid obesity.











Subjective:


Patient was seen and examined at the bedside.











Examination:


General appearance: well-developed, obese, appears stated age, no distress


HEENT: ATNC, pupils equal, no icterus


Neck: trachea midline


Respiratory: Clear to Auscultation


Cardiology: irregular, S1S2, no murmur


Gastrointestinal: soft, normoactive bowel sounds, dressing noted


Integumentary: no rash


Neurologic: AO, able to move extremities


Ext: no edema noted








Subjective


Date of service: 06/08/22





Objective





- Vital Signs


Vital signs: 


                               Vital Signs - 12hr











  06/08/22 06/08/22 06/08/22





  03:00 05:13 08:08


 


Temperature  98.2 F 98.4 F


 


Pulse Rate  76 85


 


Respiratory 18 16 





Rate   


 


Blood Pressure  145/83 132/84


 


O2 Sat by Pulse 100 100 93





Oximetry   














  06/08/22 06/08/22





  11:05 11:06


 


Temperature  


 


Pulse Rate 85 85


 


Respiratory  





Rate  


 


Blood Pressure 132/84 132/84


 


O2 Sat by Pulse  





Oximetry  














- Lab





                                 06/09/22 08:18





                                 06/09/22 08:18


                             Most recent lab results











Calcium  7.8 mg/dL (8.4-10.2)  L  06/08/22  04:59    


 


Urine Creatinine  96.9 mg/dL (0.1-20.0)  H  06/07/22  10:54    


 


Urine Sodium  30 mmol/L  06/07/22  10:54    


 


Urine Total Protein  10 mg/dL (5-11.8)   06/07/22  10:54    














Medications & Allergies





- Medications


Allergies/Adverse Reactions: 


                                    Allergies





duloxetine [From Cymbalta] Allergy (Verified 12/25/18 11:05)


   Nausea


Penicillins Allergy (Verified 06/08/22 10:11)


   Shortness of Breath and hives


lisinopril Adverse Reaction (Verified 09/26/15 07:14)


   Cough








Home Medications: 


                                Home Medications











 Medication  Instructions  Recorded  Confirmed  Last Taken  Type


 


Losartan [Cozaar] 100 mg PO QDAY 08/03/17 06/06/22 06/05/22 09:00 History


 


Amlodipine Besylate [Norvasc] 2.5 mg PO DAILY 05/26/22 06/06/22 06/06/22 05:00 

History


 


Chlorthalidone [Thalitone] 25 mg PO QDAY 05/26/22 06/06/22 06/05/22 09:00 

History


 


HYDROcodone/APAP 7.5-325 [Norco 1 each PO BID PRN 05/26/22 06/08/22 Unknown 

History





7.5/325]     


 


Aspirin [Aspirin BABY CHEW TAB] 81 mg PO QDAY 06/08/22 06/08/22 06/02/22 History


 


Gabapentin [Neurontin] 600 mg PO TID 06/08/22 06/08/22 1 Week Ago History





    ~06/01/22 


 


Hydroxyurea [Hydrea] 500 mg PO QDAY 06/08/22 06/08/22 Unknown History


 


Omeprazole 40 mg PO QDAY 06/08/22 06/08/22 06/05/22 History


 


Timolol 0.5% [Timoptic] 1 drop OU BID 06/08/22 06/08/22 Unknown History


 


estradioL [Estradiol] 2 mg PO QDAY 06/08/22 06/08/22 06/05/22 History











Active Medications: 














Generic Name Dose Route Start Last Admin





  Trade Name Freq  PRN Reason Stop Dose Admin


 


Hydrocodone Bitart/Acetaminophen  7.5 mg  06/06/22 13:18  06/07/22 09:32





  Hydrocodone/Acetaminophen 7.3-653wn-[id number] Oral Liqd  PO   7.5 mg





  Q4H PRN   Administration





  Pain, Moderate (4-6)  


 


Amlodipine Besylate  2.5 mg  06/08/22 11:00  06/08/22 11:05





  Amlodipine 5 Mg Tab  PO   2.5 mg





  QDAY SARITA   Administration


 


Enoxaparin Sodium  30 mg  06/09/22 10:00 





  Enoxaparin 30 Mg/0.3 Ml Inj  SUB-Q  





  QDAY SARITA  





  Protocol  


 


Hydralazine HCl  10 mg  06/06/22 13:18 





  Hydralazine 20 Mg/1 Ml Inj  IV  





  Q6H PRN  





  SBP > 150  


 


Hydromorphone HCl  0.5 mg  06/06/22 18:28  06/08/22 04:39





  Hydromorphone 0.5 Mg/0.5 Ml Inj  IV   0.5 mg





  Q3H PRN   Administration





  Pain , Severe (7-10)  


 


Sodium Chloride  1,000 mls @ 120 mls/hr  06/07/22 11:00 





  Nacl 0.9% 1000 Ml  IV  





  AS DIRECT SARITA  


 


Cefazolin Sodium  1 gm in 50 mls @ 100 mls/hr  06/07/22 19:00  06/08/22 11:05





  Ancef/Ns 1 Gm/50 Ml  IV   100 mls/hr





  Q8H SARITA   Administration





  Protocol  


 


Metronidazole  500 mg in 100 mls @ 100 mls/hr  06/07/22 18:30  06/08/22 10:21





  Flagyl 500 Mg/100 Ml  IV   100 mls/hr





  Q8H SARITA   Administration





  Protocol  


 


Acetaminophen  1,000 mg in 100 mls @ 400 mls/hr  06/07/22 18:00  06/08/22 06:44





  Acetaminophen Iv  IV  06/08/22 12:14  400 mls/hr





  Q6H SARITA   Administration


 


Losartan Potassium  100 mg  06/08/22 11:00  06/08/22 11:06





  Losartan 50 Mg Tab  PO   100 mg





  QDAY SARITA   Administration


 


Metoclopramide HCl  5 mg  06/07/22 10:00  06/07/22 18:19





  Metoclopramide 10 Mg/2 Ml Inj  IV   5 mg





  Q6H PRN   Administration





  Nausea And Vomiting  


 


Morphine Sulfate  2 mg  06/06/22 13:18  06/06/22 16:53





  Morphine 2 Mg/1 Ml Inj  IV   2 mg





  Q4H PRN   Administration





  Pain, Moderate (4-6)  


 


Ondansetron HCl  4 mg  06/06/22 13:18 





  Ondansetron 4 Mg/2 Ml Inj  IV  





  Q4H PRN  





  Nausea And Vomiting  


 


Pantoprazole Sodium  40 mg  06/06/22 14:00  06/08/22 10:20





  Pantoprazole 40 Mg Inj  IV   40 mg





  QDAY SARITA   Administration


 


Phenol  1 spray  06/07/22 10:00  06/08/22 01:00





  Phenol 1.4% 177 Ml Bottle  MM   1 spray





  PRN PRN   Administration





  Sore Throat  


 


Simethicone  80 mg  06/06/22 13:18  06/06/22 22:04





  Simethicone 80 Mg Chew Tab  PO   80 mg





  Q6H PRN   Administration





  Gas pain

## 2022-06-08 NOTE — POST ANESTHESIA EVALUATION
- Post Anesthesia Evaluation


Patient Participated: Yes


Airway Patent: Yes


Stable Respiratory Function: Yes


Nausea/Vomiting: No


Temp > 96.8F: Yes


Pain Manageable: Yes (some breakthrough pain)


Adequeate Hydration: Yes


Anesthesia Complications: No


Block Receding Appropriately: Not Applicable


Patient on Ventilator: No

## 2022-06-09 LAB
BUN SERPL-MCNC: 28 MG/DL (ref 7–17)
BUN/CREAT SERPL: 16 %
CALCIUM SERPL-MCNC: 7.7 MG/DL (ref 8.4–10.2)
HCT VFR BLD CALC: 25.3 % (ref 30.3–42.9)
HEMOLYSIS INDEX: 3
HGB BLD-MCNC: 8.1 GM/DL (ref 10.1–14.3)
MCHC RBC AUTO-ENTMCNC: 32 % (ref 30–34)
MCV RBC AUTO: 78 FL (ref 79–97)
PLATELET # BLD: 880 K/MM3 (ref 140–440)
RBC # BLD AUTO: 3.26 M/MM3 (ref 3.65–5.03)

## 2022-06-09 RX ADMIN — HYDROCODONE BITARTRATE AND ACETAMINOPHEN PRN MG: 7.5; 325 SOLUTION ORAL at 14:19

## 2022-06-09 RX ADMIN — CEFAZOLIN SCH MLS/HR: 330 INJECTION, POWDER, FOR SOLUTION INTRAMUSCULAR; INTRAVENOUS at 03:54

## 2022-06-09 RX ADMIN — POTASSIUM CHLORIDE SCH MLS/HR: 10 INJECTION, SOLUTION INTRAVENOUS at 17:27

## 2022-06-09 RX ADMIN — POTASSIUM CHLORIDE SCH MLS/HR: 10 INJECTION, SOLUTION INTRAVENOUS at 16:00

## 2022-06-09 RX ADMIN — METRONIDAZOLE SCH MLS/HR: 5 INJECTION, SOLUTION INTRAVENOUS at 19:10

## 2022-06-09 RX ADMIN — METRONIDAZOLE SCH MLS/HR: 5 INJECTION, SOLUTION INTRAVENOUS at 02:46

## 2022-06-09 RX ADMIN — PANTOPRAZOLE SODIUM SCH MG: 40 INJECTION, POWDER, FOR SOLUTION INTRAVENOUS at 10:55

## 2022-06-09 RX ADMIN — CEFAZOLIN SCH MLS/HR: 330 INJECTION, POWDER, FOR SOLUTION INTRAMUSCULAR; INTRAVENOUS at 10:55

## 2022-06-09 RX ADMIN — CEFAZOLIN SCH MLS/HR: 330 INJECTION, POWDER, FOR SOLUTION INTRAMUSCULAR; INTRAVENOUS at 19:00

## 2022-06-09 RX ADMIN — HYDROCODONE BITARTRATE AND ACETAMINOPHEN PRN MG: 7.5; 325 SOLUTION ORAL at 00:10

## 2022-06-09 RX ADMIN — LOSARTAN POTASSIUM SCH MG: 50 TABLET, FILM COATED ORAL at 10:55

## 2022-06-09 RX ADMIN — METRONIDAZOLE SCH MLS/HR: 5 INJECTION, SOLUTION INTRAVENOUS at 10:56

## 2022-06-09 RX ADMIN — POTASSIUM CHLORIDE SCH MLS/HR: 10 INJECTION, SOLUTION INTRAVENOUS at 15:00

## 2022-06-09 RX ADMIN — SODIUM CHLORIDE SCH MLS/HR: 0.9 INJECTION, SOLUTION INTRAVENOUS at 11:03

## 2022-06-09 NOTE — PROGRESS NOTE
Assessment and Plan


1. Acute kidney injury:


Vasomotor KEVIN superimposed on CKD.


UA bland.


ATN.


IV fluids.


Monitor renal function.


Creatinine level is slightly better.


Avoid nephrotoxic agents.


Meds dosage based on GFR.





2. FEN:


Monitor lytes and volume status.





3. S/p removal of gastric band and conversion of gastric bypass / S/p diagnostic

laparoscopy due to bleeding and hypotension:


Followed by General Surgery.





4. Hypertension:


Adjust meds as needed.


Monitor.





5. Morbid obesity.











Subjective:


Patient was seen and examined at the bedside.











Examination:


General appearance: well-developed, obese, appears stated age, no distress


HEENT: ATNC, pupils equal, no icterus


Neck: trachea midline


Respiratory: Clear to Auscultation


Cardiology: regular, S1S2, no murmur


Gastrointestinal: soft, normoactive bowel sounds, dressing noted


Integumentary: no rash


Neurologic: AO, able to move extremities


Ext: no edema noted








Subjective


Date of service: 06/09/22





Objective





- Vital Signs


Vital signs: 


                               Vital Signs - 12hr











  06/08/22 06/08/22 06/09/22





  21:29 22:00 03:59


 


Temperature 98.9 F  98.8 F


 


Pulse Rate 87  86


 


Respiratory 20  16





Rate   


 


Blood Pressure   126/66


 


Blood Pressure 103/67  





[Right]   


 


O2 Sat by Pulse 100 100 95





Oximetry   














  06/09/22 06/09/22





  04:00 07:57


 


Temperature  98.0 F


 


Pulse Rate 92 H 88


 


Respiratory  18





Rate  


 


Blood Pressure  


 


Blood Pressure  147/89





[Right]  


 


O2 Sat by Pulse 95 90





Oximetry  














- Lab





                                 06/09/22 08:18





                                 06/09/22 08:18


                             Most recent lab results











Calcium  7.8 mg/dL (8.4-10.2)  L  06/08/22  04:59    


 


Urine Creatinine  96.9 mg/dL (0.1-20.0)  H  06/07/22  10:54    


 


Urine Sodium  30 mmol/L  06/07/22  10:54    


 


Urine Total Protein  10 mg/dL (5-11.8)   06/07/22  10:54    














Medications & Allergies





- Medications


Allergies/Adverse Reactions: 


                                    Allergies





duloxetine [From Cymbalta] Allergy (Verified 12/25/18 11:05)


   Nausea


Penicillins Allergy (Verified 06/08/22 10:11)


   Shortness of Breath and hives


lisinopril Adverse Reaction (Verified 09/26/15 07:14)


   Cough








Home Medications: 


                                Home Medications











 Medication  Instructions  Recorded  Confirmed  Last Taken  Type


 


Losartan [Cozaar] 100 mg PO QDAY 08/03/17 06/06/22 06/05/22 09:00 History


 


Amlodipine Besylate [Norvasc] 2.5 mg PO DAILY 05/26/22 06/06/22 06/06/22 05:00 

History


 


Chlorthalidone [Thalitone] 25 mg PO QDAY 05/26/22 06/06/22 06/05/22 09:00 

History


 


HYDROcodone/APAP 7.5-325 [Norco 1 each PO BID PRN 05/26/22 06/08/22 Unknown 

History





7.5/325]     


 


Aspirin [Aspirin BABY CHEW TAB] 81 mg PO QDAY 06/08/22 06/08/22 06/02/22 History


 


Gabapentin [Neurontin] 600 mg PO TID 06/08/22 06/08/22 1 Week Ago History





    ~06/01/22 


 


Hydroxyurea [Hydrea] 500 mg PO QDAY 06/08/22 06/08/22 Unknown History


 


Omeprazole 40 mg PO QDAY 06/08/22 06/08/22 06/05/22 History


 


Timolol 0.5% [Timoptic] 1 drop OU BID 06/08/22 06/08/22 Unknown History


 


estradioL [Estradiol] 2 mg PO QDAY 06/08/22 06/08/22 06/05/22 History











Active Medications: 














Generic Name Dose Route Start Last Admin





  Trade Name Freq  PRN Reason Stop Dose Admin


 


Hydrocodone Bitart/Acetaminophen  7.5 mg  06/06/22 13:18  06/09/22 00:10





  Hydrocodone/Acetaminophen 7.3-722cw-28mh Oral Liqd  PO   7.5 mg





  Q4H PRN   Administration





  Pain, Moderate (4-6)  


 


Amlodipine Besylate  2.5 mg  06/08/22 11:00  06/08/22 11:05





  Amlodipine 5 Mg Tab  PO   2.5 mg





  QDAY SARITA   Administration


 


Enoxaparin Sodium  30 mg  06/09/22 10:00 





  Enoxaparin 30 Mg/0.3 Ml Inj  SUB-Q  





  QDAY SARITA  





  Protocol  


 


Hydralazine HCl  10 mg  06/06/22 13:18 





  Hydralazine 20 Mg/1 Ml Inj  IV  





  Q6H PRN  





  SBP > 150  


 


Hydromorphone HCl  0.5 mg  06/06/22 18:28  06/08/22 21:42





  Hydromorphone 0.5 Mg/0.5 Ml Inj  IV   0.5 mg





  Q3H PRN   Administration





  Pain , Severe (7-10)  


 


Sodium Chloride  1,000 mls @ 120 mls/hr  06/07/22 11:00  06/08/22 21:48





  Nacl 0.9% 1000 Ml  IV   120 mls/hr





  AS DIRECT SARITA   Administration


 


Cefazolin Sodium  1 gm in 50 mls @ 100 mls/hr  06/07/22 19:00  06/09/22 03:54





  Ancef/Ns 1 Gm/50 Ml  IV   100 mls/hr





  Q8H SARITA   Administration





  Protocol  


 


Metronidazole  500 mg in 100 mls @ 100 mls/hr  06/07/22 18:30  06/09/22 02:46





  Flagyl 500 Mg/100 Ml  IV   100 mls/hr





  Q8H SARITA   Administration





  Protocol  


 


Metoclopramide HCl  5 mg  06/07/22 10:00  06/07/22 18:19





  Metoclopramide 10 Mg/2 Ml Inj  IV   5 mg





  Q6H PRN   Administration





  Nausea And Vomiting  


 


Morphine Sulfate  2 mg  06/06/22 13:18  06/06/22 16:53





  Morphine 2 Mg/1 Ml Inj  IV   2 mg





  Q4H PRN   Administration





  Pain, Moderate (4-6)  


 


Ondansetron HCl  4 mg  06/06/22 13:18 





  Ondansetron 4 Mg/2 Ml Inj  IV  





  Q4H PRN  





  Nausea And Vomiting  


 


Pantoprazole Sodium  40 mg  06/06/22 14:00  06/08/22 10:20





  Pantoprazole 40 Mg Inj  IV   40 mg





  QDAY SARITA   Administration


 


Phenol  1 spray  06/07/22 10:00  06/08/22 01:00





  Phenol 1.4% 177 Ml Bottle  MM   1 spray





  PRN PRN   Administration





  Sore Throat  


 


Simethicone  80 mg  06/06/22 13:18  06/06/22 22:04





  Simethicone 80 Mg Chew Tab  PO   80 mg





  Q6H PRN   Administration





  Gas pain

## 2022-06-09 NOTE — PROGRESS NOTE
Assessment and Plan





Postop day #3 status post removal of gastric band and conversion of gastric 

bypass.  Postop day #2 status post diagnostic laparoscopy for takeback due to 

bleeding and hypotension. (No discrete bleeding to control, patient had 

generalized oozing at all surgical sites including trocar sites) hypotension has

resolved patient is afebrile and stable with normalization of white blood cell 

count.





Acute on chronic renal insufficiency exacerbated by acute postsurgical blood 

loss.  BUN and creatinine slowly improving.  Patient has a history of a 

creatinine of 1.8 last year.  Renal on board appreciate recommendations.  We 

will continue to avoid nephrotoxic agents and IV hydration.


We will recheck labs in AM.  If okay with renal, BUN and creatinine not 

worsening with tomorrow's labs and H&H is stable will discharge tomorrow.





Subjective


Date of service: 06/09/22


Narrative: 





No acute events overnight.  Patient says that she is starting to feel better.  

She is ambulating she is tolerating liquids.  Patient's been able to use the 

restroom.





Objective


                               Vital Signs - 12hr











  06/09/22 06/09/22 06/09/22





  03:59 04:00 07:57


 


Temperature 98.8 F  98.0 F


 


Pulse Rate 86 92 H 88


 


Respiratory 16  18





Rate   


 


Blood Pressure 126/66  


 


Blood Pressure   147/89





[Right]   


 


O2 Sat by Pulse 95 95 90





Oximetry   














  06/09/22 06/09/22





  10:55 12:13


 


Temperature  98.8 F


 


Pulse Rate 14 L 95 H


 


Respiratory  16





Rate  


 


Blood Pressure 145/72 


 


Blood Pressure  140/66





[Right]  


 


O2 Sat by Pulse  96





Oximetry  














- General physical appearance


well developed, no distress, moderate pain





- Eyes


PERRL





- ENT


no hearing loss





- Respiratory


normal expansion, normal respiratory effort





- Abdomen


soft, other (Appropriately tender to palpation, incisions clean dry and intact, 

MARYANNE drain with 30 cc recorded serosanguineous)





- Labs





                                 06/09/22 08:18





                                 06/09/22 08:18


                                 Diabetes panel











  06/09/22 Range/Units





  08:18 


 


Sodium  142  (137-145)  mmol/L


 


Potassium  3.3 L  (3.6-5.0)  mmol/L


 


Chloride  110.3 H  ()  mmol/L


 


Carbon Dioxide  23  (22-30)  mmol/L


 


BUN  28 H  (7-17)  mg/dL


 


Creatinine  1.7 H  (0.6-1.2)  mg/dL


 


Glucose  86  ()  mg/dL


 


Calcium  7.7 L  (8.4-10.2)  mg/dL








                                  Calcium panel











  06/09/22 Range/Units





  08:18 


 


Calcium  7.7 L  (8.4-10.2)  mg/dL








                                 Pituitary panel











  06/09/22 Range/Units





  08:18 


 


Sodium  142  (137-145)  mmol/L


 


Potassium  3.3 L  (3.6-5.0)  mmol/L


 


Chloride  110.3 H  ()  mmol/L


 


Carbon Dioxide  23  (22-30)  mmol/L


 


BUN  28 H  (7-17)  mg/dL


 


Creatinine  1.7 H  (0.6-1.2)  mg/dL


 


Glucose  86  ()  mg/dL


 


Calcium  7.7 L  (8.4-10.2)  mg/dL








                                  Adrenal panel











  06/09/22 Range/Units





  08:18 


 


Sodium  142  (137-145)  mmol/L


 


Potassium  3.3 L  (3.6-5.0)  mmol/L


 


Chloride  110.3 H  ()  mmol/L


 


Carbon Dioxide  23  (22-30)  mmol/L


 


BUN  28 H  (7-17)  mg/dL


 


Creatinine  1.7 H  (0.6-1.2)  mg/dL


 


Glucose  86  ()  mg/dL


 


Calcium  7.7 L  (8.4-10.2)  mg/dL

## 2022-06-10 LAB
BUN SERPL-MCNC: 15 MG/DL (ref 7–17)
BUN/CREAT SERPL: 12 %
CALCIUM SERPL-MCNC: 8.1 MG/DL (ref 8.4–10.2)
HCT VFR BLD CALC: 22.4 % (ref 30.3–42.9)
HEMOLYSIS INDEX: 2
HGB BLD-MCNC: 7.4 GM/DL (ref 10.1–14.3)
MCHC RBC AUTO-ENTMCNC: 33 % (ref 30–34)
MCV RBC AUTO: 77 FL (ref 79–97)
PLATELET # BLD: 856 K/MM3 (ref 140–440)
RBC # BLD AUTO: 2.91 M/MM3 (ref 3.65–5.03)

## 2022-06-10 PROCEDURE — 30233N1 TRANSFUSION OF NONAUTOLOGOUS RED BLOOD CELLS INTO PERIPHERAL VEIN, PERCUTANEOUS APPROACH: ICD-10-PCS | Performed by: SURGERY

## 2022-06-10 RX ADMIN — HYDROMORPHONE HYDROCHLORIDE PRN MG: 1 INJECTION, SOLUTION INTRAMUSCULAR; INTRAVENOUS; SUBCUTANEOUS at 18:42

## 2022-06-10 RX ADMIN — CEFAZOLIN SCH MLS/HR: 330 INJECTION, POWDER, FOR SOLUTION INTRAMUSCULAR; INTRAVENOUS at 10:31

## 2022-06-10 RX ADMIN — METRONIDAZOLE SCH MLS/HR: 5 INJECTION, SOLUTION INTRAVENOUS at 18:43

## 2022-06-10 RX ADMIN — HYDROMORPHONE HYDROCHLORIDE PRN MG: 1 INJECTION, SOLUTION INTRAMUSCULAR; INTRAVENOUS; SUBCUTANEOUS at 11:32

## 2022-06-10 RX ADMIN — LOSARTAN POTASSIUM SCH MG: 50 TABLET, FILM COATED ORAL at 10:31

## 2022-06-10 RX ADMIN — METRONIDAZOLE SCH MLS/HR: 5 INJECTION, SOLUTION INTRAVENOUS at 02:30

## 2022-06-10 RX ADMIN — CEFAZOLIN SCH MLS/HR: 330 INJECTION, POWDER, FOR SOLUTION INTRAMUSCULAR; INTRAVENOUS at 18:43

## 2022-06-10 RX ADMIN — PANTOPRAZOLE SODIUM SCH MG: 40 INJECTION, POWDER, FOR SOLUTION INTRAVENOUS at 10:31

## 2022-06-10 RX ADMIN — METRONIDAZOLE SCH MLS/HR: 5 INJECTION, SOLUTION INTRAVENOUS at 10:32

## 2022-06-10 RX ADMIN — CEFAZOLIN SCH MLS/HR: 330 INJECTION, POWDER, FOR SOLUTION INTRAMUSCULAR; INTRAVENOUS at 03:00

## 2022-06-10 NOTE — PROGRESS NOTE
Assessment and Plan


1. Acute kidney injury:


Vasomotor KEVIN superimposed on CKD.


UA bland.


ATN.


IV fluids.


Monitor renal function.


Creatinine level is improving.


Avoid nephrotoxic agents.


Meds dosage based on GFR.





2. FEN:


Monitor lytes and volume status.





3. S/p removal of gastric band and conversion of gastric bypass / S/p diagnostic

laparoscopy due to bleeding and hypotension:


Followed by General Surgery.





4. Microcytic Anemia:


PRBC today.


Monitor.





5. Hypertension:


Adjust meds as needed.


Monitor.





6. Morbid obesity.











Subjective:


Patient was seen and examined at the bedside.


Doing ok.











Examination:


General appearance: well-developed, obese, appears stated age, no distress


HEENT: ATNC, pupils equal, no icterus


Neck: trachea midline


Respiratory: Clear to Auscultation


Cardiology: regular, S1S2, no murmur


Gastrointestinal: soft, normoactive bowel sounds, dressing noted


Integumentary: no rash


Neurologic: AO, able to move extremities


Ext: no edema noted








Subjective


Date of service: 06/10/22





Objective





- Vital Signs


Vital signs: 


                               Vital Signs - 12hr











  06/09/22 06/09/22 06/10/22





  22:00 23:17 02:58


 


Temperature  98.1 F 98.2 F


 


Pulse Rate  101 H 103 H


 


Respiratory  16 16





Rate   


 


Blood Pressure  125/71 124/72


 


O2 Sat by Pulse 100 96 95





Oximetry   














  06/10/22





  07:36


 


Temperature 98.4 F


 


Pulse Rate 99 H


 


Respiratory 18





Rate 


 


Blood Pressure 135/87


 


O2 Sat by Pulse 93





Oximetry 














- Lab





                                 06/11/22 04:00





                                 06/11/22 04:00


                             Most recent lab results











Calcium  8.1 mg/dL (8.4-10.2)  L  06/10/22  04:19    


 


Urine Creatinine  96.9 mg/dL (0.1-20.0)  H  06/07/22  10:54    


 


Urine Sodium  30 mmol/L  06/07/22  10:54    


 


Urine Total Protein  10 mg/dL (5-11.8)   06/07/22  10:54    














Medications & Allergies





- Medications


Allergies/Adverse Reactions: 


                                    Allergies





duloxetine [From Cymbalta] Allergy (Verified 12/25/18 11:05)


   Nausea


Penicillins Allergy (Verified 06/08/22 10:11)


   Shortness of Breath and hives


lisinopril Adverse Reaction (Verified 09/26/15 07:14)


   Cough








Home Medications: 


                                Home Medications











 Medication  Instructions  Recorded  Confirmed  Last Taken  Type


 


Losartan [Cozaar] 100 mg PO QDAY 08/03/17 06/06/22 06/05/22 09:00 History


 


Amlodipine Besylate [Norvasc] 2.5 mg PO DAILY 05/26/22 06/06/22 06/06/22 05:00 

History


 


Chlorthalidone [Thalitone] 25 mg PO QDAY 05/26/22 06/06/22 06/05/22 09:00 

History


 


HYDROcodone/APAP 7.5-325 [Norco 1 each PO BID PRN 05/26/22 06/08/22 Unknown 

History





7.5/325]     


 


Aspirin [Aspirin BABY CHEW TAB] 81 mg PO QDAY 06/08/22 06/08/22 06/02/22 History


 


Gabapentin [Neurontin] 600 mg PO TID 06/08/22 06/08/22 1 Week Ago History





    ~06/01/22 


 


Hydroxyurea [Hydrea] 500 mg PO QDAY 06/08/22 06/08/22 Unknown History


 


Omeprazole 40 mg PO QDAY 06/08/22 06/08/22 06/05/22 History


 


Timolol 0.5% [Timoptic] 1 drop OU BID 06/08/22 06/08/22 Unknown History


 


estradioL [Estradiol] 2 mg PO QDAY 06/08/22 06/08/22 06/05/22 History











Active Medications: 














Generic Name Dose Route Start Last Admin





  Trade Name Freq  PRN Reason Stop Dose Admin


 


Hydrocodone Bitart/Acetaminophen  7.5 mg  06/06/22 13:18  06/09/22 14:19





  Hydrocodone/Acetaminophen [id number]-[id number] Oral Liqd  PO   7.5 mg





  Q4H PRN   Administration





  Pain, Moderate (4-6)  


 


Amlodipine Besylate  2.5 mg  06/08/22 11:00  06/09/22 11:26





  Amlodipine 5 Mg Tab  PO   2.5 mg





  QDAY SARITA   Administration


 


Fluticasone Propionate  100 mcg  06/10/22 02:29  06/10/22 04:16





  Fluticasone Propionate Nasal Spray 16 Gm  NS   100 mcg





  QDAY PRN   Administration





  Congestion  


 


Hydralazine HCl  10 mg  06/06/22 13:18 





  Hydralazine 20 Mg/1 Ml Inj  IV  





  Q6H PRN  





  SBP > 150  


 


Hydromorphone HCl  0.5 mg  06/06/22 18:28  06/08/22 21:42





  Hydromorphone 0.5 Mg/0.5 Ml Inj  IV   0.5 mg





  Q3H PRN   Administration





  Pain , Severe (7-10)  


 


Cefazolin Sodium  1 gm in 50 mls @ 100 mls/hr  06/07/22 19:00  06/10/22 03:00





  Ancef/Ns 1 Gm/50 Ml  IV   100 mls/hr





  Q8H SARITA   Administration





  Protocol  


 


Metronidazole  500 mg in 100 mls @ 100 mls/hr  06/07/22 18:30  06/10/22 02:30





  Flagyl 500 Mg/100 Ml  IV   100 mls/hr





  Q8H SARITA   Administration





  Protocol  


 


Losartan Potassium  100 mg  06/09/22 11:00  06/09/22 10:55





  Losartan 50 Mg Tab  PO   100 mg





  QDAY SARITA   Administration


 


Metoclopramide HCl  5 mg  06/07/22 10:00  06/07/22 18:19





  Metoclopramide 10 Mg/2 Ml Inj  IV   5 mg





  Q6H PRN   Administration





  Nausea And Vomiting  


 


Morphine Sulfate  2 mg  06/06/22 13:18  06/06/22 16:53





  Morphine 2 Mg/1 Ml Inj  IV   2 mg





  Q4H PRN   Administration





  Pain, Moderate (4-6)  


 


Ondansetron HCl  4 mg  06/06/22 13:18 





  Ondansetron 4 Mg/2 Ml Inj  IV  





  Q4H PRN  





  Nausea And Vomiting  


 


Pantoprazole Sodium  40 mg  06/06/22 14:00  06/09/22 10:55





  Pantoprazole 40 Mg Inj  IV   40 mg





  QDAY SARITA   Administration


 


Phenol  1 spray  06/07/22 10:00  06/08/22 01:00





  Phenol 1.4% 177 Ml Bottle  MM   1 spray





  PRN PRN   Administration





  Sore Throat  


 


Potassium Chloride  40 meq  06/10/22 07:30 





  Potassium Chloride Er 20 Meq Tab  PO  06/10/22 11:30 





  ONCE@0730 SARITA  


 


Simethicone  80 mg  06/06/22 13:18  06/06/22 22:04





  Simethicone 80 Mg Chew Tab  PO   80 mg





  Q6H PRN   Administration





  Gas pain

## 2022-06-10 NOTE — PROGRESS NOTE
Assessment and Plan





Postop day #4 status post removal of gastric band and conversion of gastric 

bypass.  Postop day #3 status post diagnostic laparoscopy for takeback due to 

bleeding and hypotension. (No discrete bleeding to control, patient had 

generalized oozing at all surgical sites including trocar sites) hypotension has

resolved patient is afebrile and stable with normalization of white blood cell 

count.





Acute on chronic renal insufficiency exacerbated by acute postsurgical blood 

loss.  BUN and creatinine back to baseline of pre-op 1.3.  Patient has a history

of a creatinine of 1.8 last year.  Renal on board appreciate recommendations.  

We will continue to avoid nephrotoxic agents and IV hydration.


Dizziness and palpitations likely due to downtrend and hemoglobin.  7.4 today, 

unlikely due to acute bleeding but more to dilutional effect.  We will transfuse

1 unit PRBC.  If H&H is stable and patient feels better will hopefully discharge

tomorrow.





Subjective


Date of service: 06/10/22


Narrative: 


Pt says overall she feels ok but started having severe headaches and dizziness w

hen standing as well as palpitations. She is tolerating liquids well and pain is

better controlled. Denies any bloody bowel movement.





Objective


                               Vital Signs - 12hr











  06/09/22 06/09/22 06/10/22





  22:00 23:17 02:58


 


Temperature  98.1 F 98.2 F


 


Pulse Rate  101 H 103 H


 


Respiratory  16 16





Rate   


 


Blood Pressure  125/71 124/72


 


O2 Sat by Pulse 100 96 95





Oximetry   














  06/10/22





  07:36


 


Temperature 98.4 F


 


Pulse Rate 99 H


 


Respiratory 18





Rate 


 


Blood Pressure 135/87


 


O2 Sat by Pulse 93





Oximetry 














- General physical appearance


well developed, no distress, no pain





- Eyes


PERRL





- ENT


no hearing loss





- Respiratory


normal expansion, normal respiratory effort





- Abdomen


soft, other (Incisions clean dry and intact, appropriately tender to palpation, 

MARYANNE drain 30 to 40 cc measure last 24 hours serosanguineous)





- Labs





                                 06/10/22 04:19





                                 06/10/22 04:19


                                 Diabetes panel











  06/10/22 Range/Units





  04:19 


 


Sodium  143  (137-145)  mmol/L


 


Potassium  3.4 L  (3.6-5.0)  mmol/L


 


Chloride  110.3 H  ()  mmol/L


 


Carbon Dioxide  24  (22-30)  mmol/L


 


BUN  15  (7-17)  mg/dL


 


Creatinine  1.3 H  (0.6-1.2)  mg/dL


 


Glucose  98  ()  mg/dL


 


Calcium  8.1 L  (8.4-10.2)  mg/dL








                                  Calcium panel











  06/10/22 Range/Units





  04:19 


 


Calcium  8.1 L  (8.4-10.2)  mg/dL








                                 Pituitary panel











  06/10/22 Range/Units





  04:19 


 


Sodium  143  (137-145)  mmol/L


 


Potassium  3.4 L  (3.6-5.0)  mmol/L


 


Chloride  110.3 H  ()  mmol/L


 


Carbon Dioxide  24  (22-30)  mmol/L


 


BUN  15  (7-17)  mg/dL


 


Creatinine  1.3 H  (0.6-1.2)  mg/dL


 


Glucose  98  ()  mg/dL


 


Calcium  8.1 L  (8.4-10.2)  mg/dL








                                  Adrenal panel











  06/10/22 Range/Units





  04:19 


 


Sodium  143  (137-145)  mmol/L


 


Potassium  3.4 L  (3.6-5.0)  mmol/L


 


Chloride  110.3 H  ()  mmol/L


 


Carbon Dioxide  24  (22-30)  mmol/L


 


BUN  15  (7-17)  mg/dL


 


Creatinine  1.3 H  (0.6-1.2)  mg/dL


 


Glucose  98  ()  mg/dL


 


Calcium  8.1 L  (8.4-10.2)  mg/dL

## 2022-06-11 VITALS — SYSTOLIC BLOOD PRESSURE: 164 MMHG | DIASTOLIC BLOOD PRESSURE: 93 MMHG

## 2022-06-11 LAB
BUN SERPL-MCNC: 9 MG/DL (ref 7–17)
BUN/CREAT SERPL: 8 %
CALCIUM SERPL-MCNC: 8.5 MG/DL (ref 8.4–10.2)
HCT VFR BLD CALC: 26 % (ref 30.3–42.9)
HEMOLYSIS INDEX: 0
HGB BLD-MCNC: 8.6 GM/DL (ref 10.1–14.3)
MCHC RBC AUTO-ENTMCNC: 33 % (ref 30–34)
MCV RBC AUTO: 78 FL (ref 79–97)
PLATELET # BLD: 905 K/MM3 (ref 140–440)
RBC # BLD AUTO: 3.32 M/MM3 (ref 3.65–5.03)

## 2022-06-11 RX ADMIN — SIMETHICONE PRN MG: 80 TABLET, CHEWABLE ORAL at 06:36

## 2022-06-11 RX ADMIN — CEFAZOLIN SCH MLS/HR: 330 INJECTION, POWDER, FOR SOLUTION INTRAMUSCULAR; INTRAVENOUS at 02:26

## 2022-06-11 RX ADMIN — METRONIDAZOLE SCH MLS/HR: 5 INJECTION, SOLUTION INTRAVENOUS at 02:29

## 2022-06-11 RX ADMIN — HYDROMORPHONE HYDROCHLORIDE PRN MG: 1 INJECTION, SOLUTION INTRAMUSCULAR; INTRAVENOUS; SUBCUTANEOUS at 06:31

## 2022-06-11 NOTE — PROGRESS NOTE
Assessment and Plan


1. Acute kidney injury:


Vasomotor KEVIN superimposed on CKD.


UA bland.


ATN.


IV fluids.


Monitor renal function.


Creatinine level is improving.


Avoid nephrotoxic agents.


Meds dosage based on GFR.





2. FEN:


Replete Mg and Phos.


Monitor lytes and volume status.





3. S/p removal of gastric band and conversion of gastric bypass / S/p diagnostic

laparoscopy due to bleeding and hypotension:


Followed by General Surgery.





4. Microcytic Anemia:


S/p PRBC 6/10.


Monitor.





5. Hypertension:


Adjust meds as needed.


Monitor.





6. Morbid obesity.











Subjective:


Patient was seen and examined at the bedside.


Doing ok.











Examination:


General appearance: well-developed, obese, appears stated age, no distress


HEENT: ATNC, pupils equal, no icterus


Neck: trachea midline


Respiratory: Clear to Auscultation


Cardiology: regular, S1S2, no murmur


Gastrointestinal: soft, normoactive bowel sounds, dressing noted


Integumentary: no rash


Neurologic: AO, able to move extremities


Ext: no edema noted








Subjective


Date of service: 06/11/22





Objective





- Vital Signs


Vital signs: 


                               Vital Signs - 12hr











  06/10/22 06/10/22 06/11/22





  22:00 23:39 00:23


 


Temperature  98.2 F 


 


Pulse Rate  89 


 


Pulse Rate [   92 H





Anterior   





Bilateral Upper   





Lobe]   


 


Respiratory  19 





Rate   


 


Respiratory   18





Rate [Anterior   





Bilateral Upper   





Lobe]   


 


Blood Pressure  143/99 


 


O2 Sat by Pulse 98 95 





Oximetry   














  06/11/22





  07:33


 


Temperature 98.3 F


 


Pulse Rate 89


 


Pulse Rate [ 





Anterior 





Bilateral Upper 





Lobe] 


 


Respiratory 18





Rate 


 


Respiratory 





Rate [Anterior 





Bilateral Upper 





Lobe] 


 


Blood Pressure 164/93


 


O2 Sat by Pulse 96





Oximetry 














- Lab





                                 06/11/22 04:00





                                 06/11/22 04:00


                             Most recent lab results











Calcium  8.5 mg/dL (8.4-10.2)   06/11/22  04:00    


 


Phosphorus  1.30 mg/dL (2.5-4.5)  L  06/11/22  04:00    


 


Magnesium  1.50 mg/dL (1.7-2.3)  L  06/11/22  04:00    


 


Urine Creatinine  96.9 mg/dL (0.1-20.0)  H  06/07/22  10:54    


 


Urine Sodium  30 mmol/L  06/07/22  10:54    


 


Urine Total Protein  10 mg/dL (5-11.8)   06/07/22  10:54    














Medications & Allergies





- Medications


Allergies/Adverse Reactions: 


                                    Allergies





duloxetine [From Cymbalta] Allergy (Verified 12/25/18 11:05)


   Nausea


Penicillins Allergy (Verified 06/08/22 10:11)


   Shortness of Breath and hives


lisinopril Adverse Reaction (Verified 09/26/15 07:14)


   Cough








Home Medications: 


                                Home Medications











 Medication  Instructions  Recorded  Confirmed  Last Taken  Type


 


Losartan [Cozaar] 100 mg PO QDAY 08/03/17 06/06/22 06/05/22 09:00 History


 


Amlodipine Besylate [Norvasc] 2.5 mg PO DAILY 05/26/22 06/06/22 06/06/22 05:00 

History


 


Chlorthalidone [Thalitone] 25 mg PO QDAY 05/26/22 06/06/22 06/05/22 09:00 

History


 


HYDROcodone/APAP 7.5-325 [Norco 1 each PO BID PRN 05/26/22 06/08/22 Unknown 

History





7.5/325]     


 


Aspirin [Aspirin BABY CHEW TAB] 81 mg PO QDAY 06/08/22 06/08/22 06/02/22 History


 


Gabapentin [Neurontin] 600 mg PO TID 06/08/22 06/08/22 1 Week Ago History





    ~06/01/22 


 


Hydroxyurea [Hydrea] 500 mg PO QDAY 06/08/22 06/08/22 Unknown History


 


Omeprazole 40 mg PO QDAY 06/08/22 06/08/22 06/05/22 History


 


Timolol 0.5% [Timoptic] 1 drop OU BID 06/08/22 06/08/22 Unknown History


 


estradioL [Estradiol] 2 mg PO QDAY 06/08/22 06/08/22 06/05/22 History











Active Medications: 














Generic Name Dose Route Start Last Admin





  Trade Name Freq  PRN Reason Stop Dose Admin


 


Hydrocodone Bitart/Acetaminophen  7.5 mg  06/06/22 13:18  06/09/22 14:19





  Hydrocodone/Acetaminophen [id number] Oral Liqd  PO   7.5 mg





  Q4H PRN   Administration





  Pain, Moderate (4-6)  


 


Albuterol  2.5 mg  06/10/22 15:08  06/11/22 00:23





  Albuterol 2.5 Mg/3 Ml Nebu  IH   2.5 mg





  Q4H PRN   Administration





  Wheezing  


 


Amlodipine Besylate  2.5 mg  06/08/22 11:00  06/10/22 10:31





  Amlodipine 5 Mg Tab  PO   2.5 mg





  QDAY SARITA   Administration


 


Fluticasone Propionate  100 mcg  06/10/22 02:29  06/10/22 04:16





  Fluticasone Propionate Nasal Spray 16 Gm  NS   100 mcg





  QDAY PRN   Administration





  Congestion  


 


Hydralazine HCl  10 mg  06/06/22 13:18 





  Hydralazine 20 Mg/1 Ml Inj  IV  





  Q6H PRN  





  SBP > 150  


 


Hydromorphone HCl  0.5 mg  06/06/22 18:28  06/11/22 06:31





  Hydromorphone 0.5 Mg/0.5 Ml Inj  IV   0.5 mg





  Q3H PRN   Administration





  Pain , Severe (7-10)  


 


Cefazolin Sodium  1 gm in 50 mls @ 100 mls/hr  06/07/22 19:00  06/11/22 02:26





  Ancef/Ns 1 Gm/50 Ml  IV   100 mls/hr





  Q8H SARITA   Administration





  Protocol  


 


Metronidazole  500 mg in 100 mls @ 100 mls/hr  06/07/22 18:30  06/11/22 02:29





  Flagyl 500 Mg/100 Ml  IV   100 mls/hr





  Q8H SARITA   Administration





  Protocol  


 


Potassium Phosphate 30 mmol/  510 mls @ 85 mls/hr  06/11/22 07:50 





  Dextrose  IV  06/11/22 13:49 





  ONCE ONE  


 


Losartan Potassium  100 mg  06/09/22 11:00  06/10/22 10:31





  Losartan 50 Mg Tab  PO   100 mg





  QDAY SARITA   Administration


 


Magnesium Oxide  400 mg  06/11/22 10:00 





  Magnesium Oxide 400 Mg Tab  PO  





  BID [facility]  


 


Metoclopramide HCl  5 mg  06/07/22 10:00  06/07/22 18:19





  Metoclopramide 10 Mg/2 Ml Inj  IV   5 mg





  Q6H PRN   Administration





  Nausea And Vomiting  


 


Morphine Sulfate  2 mg  06/06/22 13:18  06/06/22 16:53





  Morphine 2 Mg/1 Ml Inj  IV   2 mg





  Q4H PRN   Administration





  Pain, Moderate (4-6)  


 


Ondansetron HCl  4 mg  06/06/22 13:18  06/11/22 08:28





  Ondansetron 4 Mg/2 Ml Inj  IV   4 mg





  Q4H PRN   Administration





  Nausea And Vomiting  


 


Pantoprazole Sodium  40 mg  06/11/22 10:00 





  Pantoprazole 40 Mg Tab  PO  





  DAILY SARITA  


 


Phenol  1 spray  06/07/22 10:00  06/08/22 01:00





  Phenol 1.4% 177 Ml Bottle  MM   1 spray





  PRN PRN   Administration





  Sore Throat  


 


Simethicone  80 mg  06/06/22 13:18  06/11/22 06:36





  Simethicone 80 Mg Chew Tab  PO   80 mg





  Q6H PRN   Administration





  Gas pain

## 2022-06-11 NOTE — DISCHARGE SUMMARY
Providers





- Providers


Date of Admission: 


06/06/22 05:36





Date of discharge: 06/11/22


Attending physician: 


ALVINA REAL MD





                                        





06/06/22 13:18


Physical Therapy Evaluation and Treat [CONS] Routine 


   Comment: 


   Reason For Exam: post op bariatric surgery





06/07/22 08:36


Consult to Physician [CONS] Routine 


   Comment: appreciate any recommendations


   Consulting Provider: CHARLI BORJA


   Physician Instructions: pt had bariatric surgery yesterday


   Reason For Exam: acute on chronic renal insuficiency





06/08/22 13:31


Physical Therapy Evaluation and Treat [CONS] Routine 


   Comment: 


   Reason For Exam: post op, get pt out of bed and moving











Primary care physician: 


TORREY JUAREZ








Hospitalization


Reason for admission: s/p bariatric surgery


Condition: Good


Procedures: 





laparoscopic lap band removal and conversion to gastric bypass


dx laparoscopy with evacuation of intra abdominal hematoma


Hospital course: 





Patient had a relatively uneventful conversion of gastric band to gastric 

bypass.  Because of generalized oozing from her surfaces a drain was placed and 

her Lovenox was held.  Postop day 1 patient showed significant sanguinous 

drainage from her drain and had intermittent episodes of hypotension.  It was 

decided to take your to the operating room for diagnostic laparoscopy.  At the 

diagnostic laparoscopy there was no acute areas of bleeding to be addressed.  

There was noted to be continued generalized oozing from all cut surfaces, as 

well as peritoneal hematomas at all trocar sites.  She has significant intra-

abdominal hematoma that was evacuated and there was Surgicel placed along the 

staple line.  Patient also has a history of chronic renal insufficiency of which

 her creatinine had become elevated.  Over the next 3 days her creatinine began 

to decrease until it was at her baseline.  She required 1 unit of PRBC on postop

 day 4 because she had a continuous slow trend down of her hemoglobin with mild 

tachycardia and dizziness that was likely due to dilution and not active 

bleeding.  Her hemoglobin stabilized and equilibrated well after the 1 unit of 

blood as well as normalization of her heart rate.  Patient was discharged on 

postop day #5 showing no gross clinical signs of leak or bleeding.  She was 

tolerating liquids well with adequate pain control.  Patient will follow-up in 

the office in a week and a half for follow-up.


Disposition: 01 HOME / SELF CARE / HOMELESS


Final Discharge Diagnosis (Prints w/discharge instructions): gerd, post op 

bleed, hx bariatric surgery





Core Measure Documentation





- Palliative Care


Palliative Care/ Comfort Measures: Not Applicable





- Core Measures


Any of the following diagnoses?: none





Exam





- Constitutional


Vitals: 


                                        











Temp Pulse Resp BP Pulse Ox


 


 98.3 F   89   18   164/93   96 


 


 06/11/22 07:33  06/11/22 07:33  06/11/22 07:33  06/11/22 07:33  06/11/22 07:33











General appearance: Present: no acute distress, obese





- EENT


Eyes: Present: PERRL


ENT: hearing intact





- Respiratory


Respiratory effort: normal





- Cardiovascular


Heart Sounds: Present: S1 & S2





- Extremities


Extremities: no ischemia





- Abdominal


General gastrointestinal: Present: soft, non-tender, other (incisions c/d/i, effie 

drain with minimal drainage, removed)





Plan


Activity: advance as tolerated


Diet: clear liquids, low carbohydrate


Wound: open to air, keep clean and dry


Durable Medical Equipment Needed Upon Discharge: [employer]


Follow up with: 


TORREY JUAREZ MD [Primary Care Provider] - 7 Days